# Patient Record
Sex: FEMALE | Race: WHITE | NOT HISPANIC OR LATINO | Employment: FULL TIME | URBAN - METROPOLITAN AREA
[De-identification: names, ages, dates, MRNs, and addresses within clinical notes are randomized per-mention and may not be internally consistent; named-entity substitution may affect disease eponyms.]

---
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
## 2021-04-29 ENCOUNTER — PATIENT MESSAGE (OUTPATIENT)
Dept: RESEARCH | Facility: HOSPITAL | Age: 21
End: 2021-04-29

## 2021-06-15 ENCOUNTER — OFFICE VISIT (OUTPATIENT)
Dept: FAMILY MEDICINE | Facility: CLINIC | Age: 21
End: 2021-06-15
Payer: COMMERCIAL

## 2021-06-15 VITALS
SYSTOLIC BLOOD PRESSURE: 103 MMHG | OXYGEN SATURATION: 100 % | DIASTOLIC BLOOD PRESSURE: 63 MMHG | HEIGHT: 61 IN | TEMPERATURE: 98 F | HEART RATE: 63 BPM | WEIGHT: 108 LBS | BODY MASS INDEX: 20.39 KG/M2

## 2021-06-15 DIAGNOSIS — F32.A DEPRESSION, UNSPECIFIED DEPRESSION TYPE: ICD-10-CM

## 2021-06-15 DIAGNOSIS — R10.84 ABDOMINAL PAIN, GENERALIZED: ICD-10-CM

## 2021-06-15 DIAGNOSIS — F90.9 ATTENTION DEFICIT HYPERACTIVITY DISORDER (ADHD), UNSPECIFIED ADHD TYPE: Primary | ICD-10-CM

## 2021-06-15 DIAGNOSIS — F41.9 ANXIETY: ICD-10-CM

## 2021-06-15 PROCEDURE — 99214 OFFICE O/P EST MOD 30 MIN: CPT | Mod: S$GLB,,, | Performed by: FAMILY MEDICINE

## 2021-06-15 PROCEDURE — 1126F PR PAIN SEVERITY QUANTIFIED, NO PAIN PRESENT: ICD-10-PCS | Mod: S$GLB,,, | Performed by: FAMILY MEDICINE

## 2021-06-15 PROCEDURE — 3008F BODY MASS INDEX DOCD: CPT | Mod: CPTII,S$GLB,, | Performed by: FAMILY MEDICINE

## 2021-06-15 PROCEDURE — 3008F PR BODY MASS INDEX (BMI) DOCUMENTED: ICD-10-PCS | Mod: CPTII,S$GLB,, | Performed by: FAMILY MEDICINE

## 2021-06-15 PROCEDURE — 1126F AMNT PAIN NOTED NONE PRSNT: CPT | Mod: S$GLB,,, | Performed by: FAMILY MEDICINE

## 2021-06-15 PROCEDURE — 99214 PR OFFICE/OUTPT VISIT, EST, LEVL IV, 30-39 MIN: ICD-10-PCS | Mod: S$GLB,,, | Performed by: FAMILY MEDICINE

## 2021-06-15 RX ORDER — SERTRALINE HYDROCHLORIDE 50 MG/1
TABLET, FILM COATED ORAL
Qty: 30 TABLET | Refills: 0 | Status: SHIPPED | OUTPATIENT
Start: 2021-06-15 | End: 2021-07-13

## 2021-06-15 RX ORDER — DEXTROAMPHETAMINE SACCHARATE, AMPHETAMINE ASPARTATE, DEXTROAMPHETAMINE SULFATE AND AMPHETAMINE SULFATE 5; 5; 5; 5 MG/1; MG/1; MG/1; MG/1
1 TABLET ORAL DAILY PRN
Qty: 30 TABLET | Refills: 0 | Status: SHIPPED | OUTPATIENT
Start: 2021-06-15 | End: 2021-07-13

## 2021-06-15 RX ORDER — BUPROPION HYDROCHLORIDE 300 MG/1
300 TABLET ORAL DAILY
Qty: 90 TABLET | Refills: 1 | Status: SHIPPED | OUTPATIENT
Start: 2021-06-15 | End: 2022-02-07

## 2021-06-16 ENCOUNTER — TELEPHONE (OUTPATIENT)
Dept: FAMILY MEDICINE | Facility: CLINIC | Age: 21
End: 2021-06-16

## 2021-07-13 ENCOUNTER — OFFICE VISIT (OUTPATIENT)
Dept: SURGERY | Facility: CLINIC | Age: 21
End: 2021-07-13
Payer: COMMERCIAL

## 2021-07-13 VITALS
HEART RATE: 71 BPM | SYSTOLIC BLOOD PRESSURE: 121 MMHG | TEMPERATURE: 97 F | DIASTOLIC BLOOD PRESSURE: 86 MMHG | BODY MASS INDEX: 20.39 KG/M2 | HEIGHT: 61 IN | WEIGHT: 108 LBS

## 2021-07-13 DIAGNOSIS — R10.84 ABDOMINAL PAIN, GENERALIZED: ICD-10-CM

## 2021-07-13 DIAGNOSIS — R11.0 POSTPRANDIAL NAUSEA: Primary | ICD-10-CM

## 2021-07-13 DIAGNOSIS — R10.84 GENERALIZED POSTPRANDIAL ABDOMINAL PAIN: ICD-10-CM

## 2021-07-13 PROCEDURE — 3008F PR BODY MASS INDEX (BMI) DOCUMENTED: ICD-10-PCS | Mod: CPTII,S$GLB,, | Performed by: SURGERY

## 2021-07-13 PROCEDURE — 99204 OFFICE O/P NEW MOD 45 MIN: CPT | Mod: S$GLB,,, | Performed by: SURGERY

## 2021-07-13 PROCEDURE — 3008F BODY MASS INDEX DOCD: CPT | Mod: CPTII,S$GLB,, | Performed by: SURGERY

## 2021-07-13 PROCEDURE — 99204 PR OFFICE/OUTPT VISIT, NEW, LEVL IV, 45-59 MIN: ICD-10-PCS | Mod: S$GLB,,, | Performed by: SURGERY

## 2021-07-13 PROCEDURE — 1126F PR PAIN SEVERITY QUANTIFIED, NO PAIN PRESENT: ICD-10-PCS | Mod: S$GLB,,, | Performed by: SURGERY

## 2021-07-13 PROCEDURE — 1126F AMNT PAIN NOTED NONE PRSNT: CPT | Mod: S$GLB,,, | Performed by: SURGERY

## 2021-07-20 ENCOUNTER — TELEPHONE (OUTPATIENT)
Dept: SURGERY | Facility: CLINIC | Age: 21
End: 2021-07-20

## 2021-07-27 ENCOUNTER — HOSPITAL ENCOUNTER (OUTPATIENT)
Dept: PREADMISSION TESTING | Facility: HOSPITAL | Age: 21
Discharge: HOME | End: 2021-07-27
Attending: SURGERY
Payer: COMMERCIAL

## 2021-07-27 ENCOUNTER — LAB VISIT (OUTPATIENT)
Dept: LAB | Facility: HOSPITAL | Age: 21
End: 2021-07-27
Attending: SURGERY
Payer: COMMERCIAL

## 2021-07-27 VITALS
SYSTOLIC BLOOD PRESSURE: 113 MMHG | HEART RATE: 72 BPM | WEIGHT: 112.56 LBS | RESPIRATION RATE: 14 BRPM | OXYGEN SATURATION: 100 % | TEMPERATURE: 98 F | BODY MASS INDEX: 21.27 KG/M2 | DIASTOLIC BLOOD PRESSURE: 78 MMHG

## 2021-07-27 DIAGNOSIS — Z01.818 PRE-OP TESTING: Primary | ICD-10-CM

## 2021-07-27 DIAGNOSIS — R10.84 GENERALIZED POSTPRANDIAL ABDOMINAL PAIN: ICD-10-CM

## 2021-07-27 DIAGNOSIS — R11.0 POSTPRANDIAL NAUSEA: Primary | ICD-10-CM

## 2021-07-27 DIAGNOSIS — Z01.818 PRE-OP TESTING: ICD-10-CM

## 2021-07-27 LAB
ANION GAP SERPL CALC-SCNC: 8 MMOL/L (ref 8–16)
BUN SERPL-MCNC: 9 MG/DL (ref 6–20)
CALCIUM SERPL-MCNC: 9.4 MG/DL (ref 8.7–10.5)
CHLORIDE SERPL-SCNC: 106 MMOL/L (ref 95–110)
CO2 SERPL-SCNC: 25 MMOL/L (ref 23–29)
CREAT SERPL-MCNC: 0.7 MG/DL (ref 0.5–1.4)
ERYTHROCYTE [DISTWIDTH] IN BLOOD BY AUTOMATED COUNT: 13.2 % (ref 11.5–14.5)
EST. GFR  (AFRICAN AMERICAN): >60 ML/MIN/1.73 M^2
EST. GFR  (NON AFRICAN AMERICAN): >60 ML/MIN/1.73 M^2
GLUCOSE SERPL-MCNC: 79 MG/DL (ref 70–110)
HCG SERPL QL: NEGATIVE
HCT VFR BLD AUTO: 39.1 % (ref 37–48.5)
HGB BLD-MCNC: 12.7 G/DL (ref 12–16)
MCH RBC QN AUTO: 30.1 PG (ref 27–31)
MCHC RBC AUTO-ENTMCNC: 32.5 G/DL (ref 32–36)
MCV RBC AUTO: 93 FL (ref 82–98)
PLATELET # BLD AUTO: 251 K/UL (ref 150–450)
PMV BLD AUTO: 9.9 FL (ref 9.2–12.9)
POTASSIUM SERPL-SCNC: 4 MMOL/L (ref 3.5–5.1)
RBC # BLD AUTO: 4.22 M/UL (ref 4–5.4)
SODIUM SERPL-SCNC: 139 MMOL/L (ref 136–145)
WBC # BLD AUTO: 4.68 K/UL (ref 3.9–12.7)

## 2021-07-27 PROCEDURE — 80048 BASIC METABOLIC PNL TOTAL CA: CPT | Performed by: SURGERY

## 2021-07-27 PROCEDURE — 85027 COMPLETE CBC AUTOMATED: CPT | Performed by: SURGERY

## 2021-07-27 PROCEDURE — 36415 COLL VENOUS BLD VENIPUNCTURE: CPT | Performed by: SURGERY

## 2021-07-27 PROCEDURE — 84703 CHORIONIC GONADOTROPIN ASSAY: CPT | Performed by: SURGERY

## 2021-07-27 RX ORDER — CEFOXITIN SODIUM 2 G/50ML
2 INJECTION, SOLUTION INTRAVENOUS
Status: DISCONTINUED | OUTPATIENT
Start: 2021-07-27 | End: 2021-07-27 | Stop reason: HOSPADM

## 2021-08-02 ENCOUNTER — HOSPITAL ENCOUNTER (OUTPATIENT)
Facility: HOSPITAL | Age: 21
Discharge: HOME | End: 2021-08-02
Attending: SURGERY | Admitting: SURGERY
Payer: COMMERCIAL

## 2021-08-02 ENCOUNTER — ANESTHESIA (OUTPATIENT)
Dept: SURGERY | Facility: HOSPITAL | Age: 21
End: 2021-08-02
Payer: COMMERCIAL

## 2021-08-02 ENCOUNTER — ANESTHESIA EVENT (OUTPATIENT)
Dept: SURGERY | Facility: HOSPITAL | Age: 21
End: 2021-08-02
Payer: COMMERCIAL

## 2021-08-02 VITALS
SYSTOLIC BLOOD PRESSURE: 97 MMHG | RESPIRATION RATE: 16 BRPM | WEIGHT: 112 LBS | OXYGEN SATURATION: 100 % | HEART RATE: 68 BPM | DIASTOLIC BLOOD PRESSURE: 60 MMHG | TEMPERATURE: 98 F | HEIGHT: 61 IN | BODY MASS INDEX: 21.14 KG/M2

## 2021-08-02 DIAGNOSIS — R11.0 POSTPRANDIAL NAUSEA: Primary | ICD-10-CM

## 2021-08-02 PROCEDURE — 71000015 HC POSTOP RECOV 1ST HR: Performed by: SURGERY

## 2021-08-02 PROCEDURE — 63600175 PHARM REV CODE 636 W HCPCS: Performed by: SURGERY

## 2021-08-02 PROCEDURE — 25000003 PHARM REV CODE 250: Performed by: ANESTHESIOLOGY

## 2021-08-02 PROCEDURE — 37000008 HC ANESTHESIA 1ST 15 MINUTES: Performed by: SURGERY

## 2021-08-02 PROCEDURE — 36000709 HC OR TIME LEV III EA ADD 15 MIN: Performed by: SURGERY

## 2021-08-02 PROCEDURE — 36000708 HC OR TIME LEV III 1ST 15 MIN: Performed by: SURGERY

## 2021-08-02 PROCEDURE — 27000080 OPTIME MED/SURG SUP & DEVICES GENERAL CLASSIFICATION: Performed by: SURGERY

## 2021-08-02 PROCEDURE — 71000039 HC RECOVERY, EACH ADD'L HOUR: Performed by: SURGERY

## 2021-08-02 PROCEDURE — 71000033 HC RECOVERY, INTIAL HOUR: Performed by: SURGERY

## 2021-08-02 PROCEDURE — 25000003 PHARM REV CODE 250: Performed by: SURGERY

## 2021-08-02 PROCEDURE — 63600175 PHARM REV CODE 636 W HCPCS: Performed by: ANESTHESIOLOGY

## 2021-08-02 PROCEDURE — 47562 PR LAP,CHOLECYSTECTOMY: ICD-10-PCS | Mod: ,,, | Performed by: SURGERY

## 2021-08-02 PROCEDURE — 37000009 HC ANESTHESIA EA ADD 15 MINS: Performed by: SURGERY

## 2021-08-02 PROCEDURE — 27201423 OPTIME MED/SURG SUP & DEVICES STERILE SUPPLY: Performed by: SURGERY

## 2021-08-02 PROCEDURE — 25000003 PHARM REV CODE 250: Performed by: NURSE ANESTHETIST, CERTIFIED REGISTERED

## 2021-08-02 PROCEDURE — 63600175 PHARM REV CODE 636 W HCPCS: Performed by: NURSE ANESTHETIST, CERTIFIED REGISTERED

## 2021-08-02 PROCEDURE — 47562 LAPAROSCOPIC CHOLECYSTECTOMY: CPT | Mod: ,,, | Performed by: SURGERY

## 2021-08-02 PROCEDURE — C9290 INJ, BUPIVACAINE LIPOSOME: HCPCS | Performed by: SURGERY

## 2021-08-02 RX ORDER — DIPHENHYDRAMINE HYDROCHLORIDE 50 MG/ML
INJECTION INTRAMUSCULAR; INTRAVENOUS
Status: DISCONTINUED | OUTPATIENT
Start: 2021-08-02 | End: 2021-08-02

## 2021-08-02 RX ORDER — ROCURONIUM BROMIDE 10 MG/ML
INJECTION, SOLUTION INTRAVENOUS
Status: DISCONTINUED | OUTPATIENT
Start: 2021-08-02 | End: 2021-08-02

## 2021-08-02 RX ORDER — FAMOTIDINE 10 MG/ML
INJECTION INTRAVENOUS
Status: DISCONTINUED | OUTPATIENT
Start: 2021-08-02 | End: 2021-08-02

## 2021-08-02 RX ORDER — DIPHENHYDRAMINE HYDROCHLORIDE 50 MG/ML
25 INJECTION INTRAMUSCULAR; INTRAVENOUS EVERY 6 HOURS PRN
Status: DISCONTINUED | OUTPATIENT
Start: 2021-08-02 | End: 2021-08-02 | Stop reason: HOSPADM

## 2021-08-02 RX ORDER — DEXAMETHASONE SODIUM PHOSPHATE 4 MG/ML
INJECTION, SOLUTION INTRA-ARTICULAR; INTRALESIONAL; INTRAMUSCULAR; INTRAVENOUS; SOFT TISSUE
Status: DISCONTINUED | OUTPATIENT
Start: 2021-08-02 | End: 2021-08-02

## 2021-08-02 RX ORDER — ONDANSETRON 4 MG/1
4 TABLET, ORALLY DISINTEGRATING ORAL ONCE
Status: DISCONTINUED | OUTPATIENT
Start: 2021-08-02 | End: 2021-08-02 | Stop reason: HOSPADM

## 2021-08-02 RX ORDER — DEXAMETHASONE SODIUM PHOSPHATE 4 MG/ML
4 INJECTION, SOLUTION INTRA-ARTICULAR; INTRALESIONAL; INTRAMUSCULAR; INTRAVENOUS; SOFT TISSUE ONCE
Status: DISCONTINUED | OUTPATIENT
Start: 2021-08-02 | End: 2021-08-02 | Stop reason: HOSPADM

## 2021-08-02 RX ORDER — SODIUM CHLORIDE 0.9 % (FLUSH) 0.9 %
10 SYRINGE (ML) INJECTION
Status: DISCONTINUED | OUTPATIENT
Start: 2021-08-02 | End: 2021-08-02 | Stop reason: HOSPADM

## 2021-08-02 RX ORDER — ACETAMINOPHEN 10 MG/ML
INJECTION, SOLUTION INTRAVENOUS
Status: DISCONTINUED | OUTPATIENT
Start: 2021-08-02 | End: 2021-08-02

## 2021-08-02 RX ORDER — HYDROMORPHONE HYDROCHLORIDE 1 MG/ML
0.2 INJECTION, SOLUTION INTRAMUSCULAR; INTRAVENOUS; SUBCUTANEOUS
Status: DISCONTINUED | OUTPATIENT
Start: 2021-08-02 | End: 2021-08-02 | Stop reason: HOSPADM

## 2021-08-02 RX ORDER — PROPOFOL 10 MG/ML
VIAL (ML) INTRAVENOUS
Status: DISCONTINUED | OUTPATIENT
Start: 2021-08-02 | End: 2021-08-02

## 2021-08-02 RX ORDER — HALOPERIDOL 5 MG/ML
0.5 INJECTION INTRAMUSCULAR EVERY 10 MIN PRN
Status: DISCONTINUED | OUTPATIENT
Start: 2021-08-02 | End: 2021-08-02 | Stop reason: HOSPADM

## 2021-08-02 RX ORDER — SCOLOPAMINE TRANSDERMAL SYSTEM 1 MG/1
1 PATCH, EXTENDED RELEASE TRANSDERMAL
Status: DISCONTINUED | OUTPATIENT
Start: 2021-08-02 | End: 2021-08-02 | Stop reason: HOSPADM

## 2021-08-02 RX ORDER — DIPHENHYDRAMINE HYDROCHLORIDE 50 MG/ML
12.5 INJECTION INTRAMUSCULAR; INTRAVENOUS EVERY 4 HOURS
Status: DISCONTINUED | OUTPATIENT
Start: 2021-08-02 | End: 2021-08-02 | Stop reason: HOSPADM

## 2021-08-02 RX ORDER — ONDANSETRON 2 MG/ML
4 INJECTION INTRAMUSCULAR; INTRAVENOUS DAILY PRN
Status: DISCONTINUED | OUTPATIENT
Start: 2021-08-02 | End: 2021-08-02 | Stop reason: HOSPADM

## 2021-08-02 RX ORDER — MEPERIDINE HYDROCHLORIDE 50 MG/ML
12.5 INJECTION INTRAMUSCULAR; INTRAVENOUS; SUBCUTANEOUS EVERY 10 MIN PRN
Status: DISCONTINUED | OUTPATIENT
Start: 2021-08-02 | End: 2021-08-02 | Stop reason: HOSPADM

## 2021-08-02 RX ORDER — BUPIVACAINE HYDROCHLORIDE AND EPINEPHRINE 2.5; 5 MG/ML; UG/ML
INJECTION, SOLUTION EPIDURAL; INFILTRATION; INTRACAUDAL; PERINEURAL
Status: DISCONTINUED | OUTPATIENT
Start: 2021-08-02 | End: 2021-08-02 | Stop reason: HOSPADM

## 2021-08-02 RX ORDER — LIDOCAINE HCL/PF 100 MG/5ML
SYRINGE (ML) INTRAVENOUS
Status: DISCONTINUED | OUTPATIENT
Start: 2021-08-02 | End: 2021-08-02

## 2021-08-02 RX ORDER — OXYCODONE HYDROCHLORIDE 5 MG/1
5 TABLET ORAL
Status: DISCONTINUED | OUTPATIENT
Start: 2021-08-02 | End: 2021-08-02 | Stop reason: HOSPADM

## 2021-08-02 RX ORDER — SUCCINYLCHOLINE CHLORIDE 20 MG/ML
INJECTION INTRAMUSCULAR; INTRAVENOUS
Status: DISCONTINUED | OUTPATIENT
Start: 2021-08-02 | End: 2021-08-02

## 2021-08-02 RX ORDER — ONDANSETRON 2 MG/ML
INJECTION INTRAMUSCULAR; INTRAVENOUS
Status: DISCONTINUED | OUTPATIENT
Start: 2021-08-02 | End: 2021-08-02

## 2021-08-02 RX ORDER — FENTANYL CITRATE 50 UG/ML
INJECTION, SOLUTION INTRAMUSCULAR; INTRAVENOUS
Status: DISCONTINUED | OUTPATIENT
Start: 2021-08-02 | End: 2021-08-02

## 2021-08-02 RX ORDER — OXYCODONE HYDROCHLORIDE 5 MG/1
5 TABLET ORAL EVERY 4 HOURS PRN
Status: DISCONTINUED | OUTPATIENT
Start: 2021-08-02 | End: 2021-08-02 | Stop reason: HOSPADM

## 2021-08-02 RX ORDER — HYDROCODONE BITARTRATE AND ACETAMINOPHEN 5; 325 MG/1; MG/1
1 TABLET ORAL EVERY 6 HOURS PRN
Qty: 25 TABLET | Refills: 0 | Status: SHIPPED | OUTPATIENT
Start: 2021-08-02 | End: 2021-08-17

## 2021-08-02 RX ADMIN — HYDROMORPHONE HYDROCHLORIDE 0.2 MG: 1 INJECTION, SOLUTION INTRAMUSCULAR; INTRAVENOUS; SUBCUTANEOUS at 10:08

## 2021-08-02 RX ADMIN — PROPOFOL 160 MG: 10 INJECTION, EMULSION INTRAVENOUS at 09:08

## 2021-08-02 RX ADMIN — FENTANYL CITRATE 100 MCG: 50 INJECTION INTRAMUSCULAR; INTRAVENOUS at 09:08

## 2021-08-02 RX ADMIN — DIPHENHYDRAMINE HYDROCHLORIDE 6.25 MG: 50 INJECTION, SOLUTION INTRAMUSCULAR; INTRAVENOUS at 09:08

## 2021-08-02 RX ADMIN — HYDROMORPHONE HYDROCHLORIDE 0.2 MG: 1 INJECTION, SOLUTION INTRAMUSCULAR; INTRAVENOUS; SUBCUTANEOUS at 11:08

## 2021-08-02 RX ADMIN — SODIUM CHLORIDE, SODIUM LACTATE, POTASSIUM CHLORIDE, AND CALCIUM CHLORIDE: .6; .31; .03; .02 INJECTION, SOLUTION INTRAVENOUS at 09:08

## 2021-08-02 RX ADMIN — ROCURONIUM BROMIDE 40 MG: 10 INJECTION, SOLUTION INTRAVENOUS at 09:08

## 2021-08-02 RX ADMIN — MIDAZOLAM HYDROCHLORIDE 2 MG: 5 INJECTION, SOLUTION INTRAMUSCULAR; INTRAVENOUS at 09:08

## 2021-08-02 RX ADMIN — FAMOTIDINE 20 MG: 10 INJECTION, SOLUTION INTRAVENOUS at 09:08

## 2021-08-02 RX ADMIN — DEXAMETHASONE SODIUM PHOSPHATE 8 MG: 4 INJECTION, SOLUTION INTRAMUSCULAR; INTRAVENOUS at 09:08

## 2021-08-02 RX ADMIN — ROCURONIUM BROMIDE 5 MG: 10 INJECTION, SOLUTION INTRAVENOUS at 09:08

## 2021-08-02 RX ADMIN — LIDOCAINE HYDROCHLORIDE 40 MG: 20 INJECTION, SOLUTION INTRAVENOUS at 09:08

## 2021-08-02 RX ADMIN — SCOPOLAMINE 1 PATCH: 1 PATCH TRANSDERMAL at 08:08

## 2021-08-02 RX ADMIN — OXYCODONE HYDROCHLORIDE 5 MG: 5 TABLET ORAL at 10:08

## 2021-08-02 RX ADMIN — ONDANSETRON 4 MG: 2 INJECTION INTRAMUSCULAR; INTRAVENOUS at 09:08

## 2021-08-02 RX ADMIN — SUCCINYLCHOLINE CHLORIDE 120 MG: 20 INJECTION, SOLUTION INTRAMUSCULAR; INTRAVENOUS at 09:08

## 2021-08-02 RX ADMIN — ACETAMINOPHEN 1000 MG: 10 INJECTION, SOLUTION INTRAVENOUS at 09:08

## 2021-08-03 RX ORDER — MIDAZOLAM HYDROCHLORIDE 5 MG/ML
INJECTION INTRAMUSCULAR; INTRAVENOUS
Status: DISCONTINUED | OUTPATIENT
Start: 2021-08-02 | End: 2021-08-03

## 2021-08-17 ENCOUNTER — OFFICE VISIT (OUTPATIENT)
Dept: SURGERY | Facility: CLINIC | Age: 21
End: 2021-08-17
Payer: COMMERCIAL

## 2021-08-17 VITALS — WEIGHT: 112 LBS | TEMPERATURE: 99 F | BODY MASS INDEX: 21.14 KG/M2 | HEIGHT: 61 IN

## 2021-08-17 DIAGNOSIS — K81.1 CHRONIC CHOLECYSTITIS: Primary | ICD-10-CM

## 2021-08-17 PROCEDURE — 99024 PR POST-OP FOLLOW-UP VISIT: ICD-10-PCS | Mod: S$GLB,,, | Performed by: SURGERY

## 2021-08-17 PROCEDURE — 99024 POSTOP FOLLOW-UP VISIT: CPT | Mod: S$GLB,,, | Performed by: SURGERY

## 2021-11-09 ENCOUNTER — OFFICE VISIT (OUTPATIENT)
Dept: FAMILY MEDICINE | Facility: CLINIC | Age: 21
End: 2021-11-09
Payer: COMMERCIAL

## 2021-11-09 VITALS
OXYGEN SATURATION: 99 % | DIASTOLIC BLOOD PRESSURE: 81 MMHG | HEIGHT: 61 IN | HEART RATE: 93 BPM | BODY MASS INDEX: 19.63 KG/M2 | WEIGHT: 104 LBS | TEMPERATURE: 97 F | SYSTOLIC BLOOD PRESSURE: 121 MMHG

## 2021-11-09 DIAGNOSIS — Z11.4 SCREENING FOR HIV (HUMAN IMMUNODEFICIENCY VIRUS): Primary | ICD-10-CM

## 2021-11-09 DIAGNOSIS — Z11.59 ENCOUNTER FOR HEPATITIS C SCREENING TEST FOR LOW RISK PATIENT: ICD-10-CM

## 2021-11-09 DIAGNOSIS — Z11.3 SCREEN FOR STD (SEXUALLY TRANSMITTED DISEASE): ICD-10-CM

## 2021-11-09 DIAGNOSIS — Z90.49 S/P CHOLECYSTECTOMY: ICD-10-CM

## 2021-11-09 PROCEDURE — 3079F PR MOST RECENT DIASTOLIC BLOOD PRESSURE 80-89 MM HG: ICD-10-PCS | Mod: CPTII,S$GLB,, | Performed by: FAMILY MEDICINE

## 2021-11-09 PROCEDURE — 1159F PR MEDICATION LIST DOCUMENTED IN MEDICAL RECORD: ICD-10-PCS | Mod: CPTII,S$GLB,, | Performed by: FAMILY MEDICINE

## 2021-11-09 PROCEDURE — 3074F SYST BP LT 130 MM HG: CPT | Mod: CPTII,S$GLB,, | Performed by: FAMILY MEDICINE

## 2021-11-09 PROCEDURE — 90471 IMMUNIZATION ADMIN: CPT | Mod: S$GLB,,, | Performed by: FAMILY MEDICINE

## 2021-11-09 PROCEDURE — 3008F BODY MASS INDEX DOCD: CPT | Mod: CPTII,S$GLB,, | Performed by: FAMILY MEDICINE

## 2021-11-09 PROCEDURE — 3008F PR BODY MASS INDEX (BMI) DOCUMENTED: ICD-10-PCS | Mod: CPTII,S$GLB,, | Performed by: FAMILY MEDICINE

## 2021-11-09 PROCEDURE — 3079F DIAST BP 80-89 MM HG: CPT | Mod: CPTII,S$GLB,, | Performed by: FAMILY MEDICINE

## 2021-11-09 PROCEDURE — 1160F PR REVIEW ALL MEDS BY PRESCRIBER/CLIN PHARMACIST DOCUMENTED: ICD-10-PCS | Mod: CPTII,S$GLB,, | Performed by: FAMILY MEDICINE

## 2021-11-09 PROCEDURE — 90686 IIV4 VACC NO PRSV 0.5 ML IM: CPT | Mod: S$GLB,,, | Performed by: FAMILY MEDICINE

## 2021-11-09 PROCEDURE — 99213 PR OFFICE/OUTPT VISIT, EST, LEVL III, 20-29 MIN: ICD-10-PCS | Mod: 25,S$GLB,, | Performed by: FAMILY MEDICINE

## 2021-11-09 PROCEDURE — 1160F RVW MEDS BY RX/DR IN RCRD: CPT | Mod: CPTII,S$GLB,, | Performed by: FAMILY MEDICINE

## 2021-11-09 PROCEDURE — 1159F MED LIST DOCD IN RCRD: CPT | Mod: CPTII,S$GLB,, | Performed by: FAMILY MEDICINE

## 2021-11-09 PROCEDURE — 90471 FLU VACCINE (QUAD) GREATER THAN OR EQUAL TO 3YO PRESERVATIVE FREE IM: ICD-10-PCS | Mod: S$GLB,,, | Performed by: FAMILY MEDICINE

## 2021-11-09 PROCEDURE — 90686 FLU VACCINE (QUAD) GREATER THAN OR EQUAL TO 3YO PRESERVATIVE FREE IM: ICD-10-PCS | Mod: S$GLB,,, | Performed by: FAMILY MEDICINE

## 2021-11-09 PROCEDURE — 3074F PR MOST RECENT SYSTOLIC BLOOD PRESSURE < 130 MM HG: ICD-10-PCS | Mod: CPTII,S$GLB,, | Performed by: FAMILY MEDICINE

## 2021-11-09 PROCEDURE — 99213 OFFICE O/P EST LOW 20 MIN: CPT | Mod: 25,S$GLB,, | Performed by: FAMILY MEDICINE

## 2021-11-10 PROBLEM — Z90.49 S/P CHOLECYSTECTOMY: Status: ACTIVE | Noted: 2021-11-10

## 2021-11-13 LAB
HCV AB S/CO SERPL IA: 0.01
HCV AB SERPL QL IA: NORMAL
HIV 1+2 AB+HIV1 P24 AG SERPL QL IA: NORMAL
HSV1 IGG SER IA-ACNC: 5.69 INDEX
HSV2 IGG SER IA-ACNC: <0.9 INDEX
RPR SER QL: NORMAL

## 2021-11-15 ENCOUNTER — TELEPHONE (OUTPATIENT)
Dept: FAMILY MEDICINE | Facility: CLINIC | Age: 21
End: 2021-11-15
Payer: COMMERCIAL

## 2021-11-17 ENCOUNTER — TELEPHONE (OUTPATIENT)
Dept: FAMILY MEDICINE | Facility: CLINIC | Age: 21
End: 2021-11-17
Payer: COMMERCIAL

## 2021-11-19 ENCOUNTER — TELEPHONE (OUTPATIENT)
Dept: FAMILY MEDICINE | Facility: CLINIC | Age: 21
End: 2021-11-19
Payer: COMMERCIAL

## 2021-12-31 ENCOUNTER — PATIENT MESSAGE (OUTPATIENT)
Dept: FAMILY MEDICINE | Facility: CLINIC | Age: 21
End: 2021-12-31
Payer: COMMERCIAL

## 2022-01-21 NOTE — TELEPHONE ENCOUNTER
No new care gaps identified.  Powered by [employer]. Reference number: [id number].   1/21/2022 8:33:46 AM CST

## 2022-02-07 RX ORDER — BUPROPION HYDROCHLORIDE 300 MG/1
TABLET ORAL
Qty: 90 TABLET | Refills: 2 | Status: SHIPPED | OUTPATIENT
Start: 2022-02-07 | End: 2023-03-22

## 2022-02-07 NOTE — TELEPHONE ENCOUNTER
Refill Authorization Note   Kandis Sosa  is requesting a refill authorization.  Brief Assessment and Rationale for Refill:  Approve     Medication Therapy Plan:       Medication Reconciliation Completed: No   Comments:   --->Care Gap information included below if applicable.   Orders Placed This Encounter    buPROPion (WELLBUTRIN XL) 300 MG 24 hr tablet      Requested Prescriptions   Signed Prescriptions Disp Refills    buPROPion (WELLBUTRIN XL) 300 MG 24 hr tablet 90 tablet 2     Sig: TAKE 1 TABLET(300 MG) BY MOUTH EVERY DAY       Psychiatry: Antidepressants - bupropion Passed - 1/21/2022  8:33 AM        Passed - Patient is at least 18 years old        Passed - Negative Pregnancy Status Check        Passed - Last BP in normal range within 360 days     BP Readings from Last 1 Encounters:   11/09/21 121/81               Passed - Valid encounter within last 15 months     Recent Visits  Date Type Provider Dept   11/09/21 Office Visit Ramon Meyers III, MD [facility]   06/15/21 Office Visit Ramon Meyers III, MD [facility]   12/22/20 Office Visit [facility]   11/11/20 Office Visit Ramon Meyers III, MD [facility]   Showing recent visits within past 720 days and meeting all other requirements  Future Appointments  No visits were found meeting these conditions.  Showing future appointments within next 150 days and meeting all other requirements      Future Appointments              In 2 days [facility]    In 2 days [facility]                    Appointments  past 12m or future 3m with PCP    Date Provider   Last Visit   11/9/2021 Ramon Meyers III, MD   Next Visit   Visit date not found Ramon Meyers III, MD   ED visits in past 90 days: 0     Note composed:4:25 PM 02/07/2022

## 2022-11-03 ENCOUNTER — PATIENT MESSAGE (OUTPATIENT)
Dept: ADMINISTRATIVE | Facility: HOSPITAL | Age: 22
End: 2022-11-03
Payer: COMMERCIAL

## 2022-11-16 ENCOUNTER — OFFICE VISIT (OUTPATIENT)
Dept: FAMILY MEDICINE | Facility: CLINIC | Age: 22
End: 2022-11-16
Payer: COMMERCIAL

## 2022-11-16 ENCOUNTER — PATIENT OUTREACH (OUTPATIENT)
Dept: ADMINISTRATIVE | Facility: HOSPITAL | Age: 22
End: 2022-11-16
Payer: COMMERCIAL

## 2022-11-16 VITALS
WEIGHT: 121.31 LBS | SYSTOLIC BLOOD PRESSURE: 118 MMHG | TEMPERATURE: 98 F | HEIGHT: 61 IN | BODY MASS INDEX: 22.91 KG/M2 | OXYGEN SATURATION: 99 % | DIASTOLIC BLOOD PRESSURE: 68 MMHG | HEART RATE: 78 BPM

## 2022-11-16 DIAGNOSIS — H10.9 CONJUNCTIVITIS OF LEFT EYE, UNSPECIFIED CONJUNCTIVITIS TYPE: Primary | ICD-10-CM

## 2022-11-16 PROCEDURE — 3008F BODY MASS INDEX DOCD: CPT | Mod: CPTII,S$GLB,, | Performed by: FAMILY MEDICINE

## 2022-11-16 PROCEDURE — 99213 PR OFFICE/OUTPT VISIT, EST, LEVL III, 20-29 MIN: ICD-10-PCS | Mod: S$GLB,,, | Performed by: FAMILY MEDICINE

## 2022-11-16 PROCEDURE — 3078F DIAST BP <80 MM HG: CPT | Mod: CPTII,S$GLB,, | Performed by: FAMILY MEDICINE

## 2022-11-16 PROCEDURE — 99213 OFFICE O/P EST LOW 20 MIN: CPT | Mod: S$GLB,,, | Performed by: FAMILY MEDICINE

## 2022-11-16 PROCEDURE — 1159F MED LIST DOCD IN RCRD: CPT | Mod: CPTII,S$GLB,, | Performed by: FAMILY MEDICINE

## 2022-11-16 PROCEDURE — 3074F PR MOST RECENT SYSTOLIC BLOOD PRESSURE < 130 MM HG: ICD-10-PCS | Mod: CPTII,S$GLB,, | Performed by: FAMILY MEDICINE

## 2022-11-16 PROCEDURE — 3074F SYST BP LT 130 MM HG: CPT | Mod: CPTII,S$GLB,, | Performed by: FAMILY MEDICINE

## 2022-11-16 PROCEDURE — 1160F RVW MEDS BY RX/DR IN RCRD: CPT | Mod: CPTII,S$GLB,, | Performed by: FAMILY MEDICINE

## 2022-11-16 PROCEDURE — 3078F PR MOST RECENT DIASTOLIC BLOOD PRESSURE < 80 MM HG: ICD-10-PCS | Mod: CPTII,S$GLB,, | Performed by: FAMILY MEDICINE

## 2022-11-16 PROCEDURE — 3008F PR BODY MASS INDEX (BMI) DOCUMENTED: ICD-10-PCS | Mod: CPTII,S$GLB,, | Performed by: FAMILY MEDICINE

## 2022-11-16 PROCEDURE — 1159F PR MEDICATION LIST DOCUMENTED IN MEDICAL RECORD: ICD-10-PCS | Mod: CPTII,S$GLB,, | Performed by: FAMILY MEDICINE

## 2022-11-16 PROCEDURE — 1160F PR REVIEW ALL MEDS BY PRESCRIBER/CLIN PHARMACIST DOCUMENTED: ICD-10-PCS | Mod: CPTII,S$GLB,, | Performed by: FAMILY MEDICINE

## 2022-11-16 RX ORDER — CETIRIZINE HYDROCHLORIDE 10 MG/1
10 TABLET ORAL NIGHTLY
COMMUNITY
Start: 2022-08-04 | End: 2023-11-30

## 2022-11-16 RX ORDER — CIPROFLOXACIN HYDROCHLORIDE 3 MG/ML
2 SOLUTION/ DROPS OPHTHALMIC EVERY 4 HOURS
Qty: 10 ML | Refills: 0 | Status: SHIPPED | OUTPATIENT
Start: 2022-11-16 | End: 2023-01-07

## 2022-11-16 RX ORDER — POLYMYXIN B SULFATE AND TRIMETHOPRIM 1; 10000 MG/ML; [USP'U]/ML
1 SOLUTION OPHTHALMIC EVERY 6 HOURS
COMMUNITY
Start: 2022-11-03 | End: 2023-07-27

## 2022-11-16 RX ORDER — DEXTROAMPHETAMINE SACCHARATE, AMPHETAMINE ASPARTATE MONOHYDRATE, DEXTROAMPHETAMINE SULFATE AND AMPHETAMINE SULFATE 5; 5; 5; 5 MG/1; MG/1; MG/1; MG/1
20 CAPSULE, EXTENDED RELEASE ORAL
COMMUNITY
End: 2023-03-22

## 2022-11-16 RX ORDER — CIPROFLOXACIN HYDROCHLORIDE 3 MG/ML
1 SOLUTION/ DROPS OPHTHALMIC
COMMUNITY
End: 2022-11-16 | Stop reason: SDUPTHER

## 2022-11-16 NOTE — PROGRESS NOTES
Subjective:       Patient ID: Kandis Sosa is a 22 y.o. female.    Chief Complaint: eye infection    HPI  Patient presents to clinic for left eye conjunctivitis for 2 weeks. No contact use. No known exposure to pink eye. Per patient symptoms started after her cat sneezed in her eye. Denies vision changes. Some photophobia.  prescribed her Polytrim. No antibiotic allergies.   Review of Systems   Eyes:  Positive for photophobia, pain, redness and itching. Negative for visual disturbance.   Respiratory: Negative.     Cardiovascular: Negative.    Psychiatric/Behavioral: Negative.         Objective:      Physical Exam  Constitutional:       Appearance: Normal appearance.   HENT:      Right Ear: Tympanic membrane, ear canal and external ear normal.      Left Ear: Tympanic membrane, ear canal and external ear normal.      Nose: Nose normal.      Mouth/Throat:      Mouth: Mucous membranes are moist.      Pharynx: Oropharynx is clear.   Eyes:      Conjunctiva/sclera:      Left eye: Left conjunctiva is injected.      Comments: 20/20 OU   Neck:      Vascular: No carotid bruit.   Cardiovascular:      Rate and Rhythm: Normal rate and regular rhythm.      Pulses: Normal pulses.      Heart sounds: Normal heart sounds.   Pulmonary:      Effort: Pulmonary effort is normal.      Breath sounds: Normal breath sounds.   Lymphadenopathy:      Cervical: No cervical adenopathy.   Skin:     General: Skin is warm and dry.   Neurological:      Mental Status: She is alert.   Psychiatric:         Behavior: Behavior normal.     Mi left corneal injection.  No photophobia.  Visual acuity is good 2020 in each eye separately and in both together.  No adenopathy.  Extraocular muscles intact.  No evidence of corneal abrasion.  No foreign body.  Assessment/Plan:     Conjunctivitis of left eye, unspecified conjunctivitis type    Other orders  -     ciprofloxacin HCl (CILOXAN) 0.3 % ophthalmic solution; Place 2 drops into the left eye every 4  (four) hours.  Dispense: 10 mL; Refill: 0     Ciloxan ophthalmic drops to q.i.d. to the left eye.

## 2022-11-16 NOTE — LETTER
AUTHORIZATION FOR RELEASE OF   CONFIDENTIAL INFORMATION    Dear Dr Patterson,    We are seeing Kandis Sosa, date of birth 2000, in the clinic at [facility]. Ramon Meyers III, MD is the patient's PCP. Kandis Sosa has an outstanding lab/procedure at the time we reviewed her chart. In order to help keep her health information updated, she has authorized us to request the following medical record(s):        (  )  MAMMOGRAM                                      (  )  COLONOSCOPY      ( X )  PAP SMEAR                                          (  )  OUTSIDE LAB RESULTS     (  )  DEXA SCAN                                          (  )  EYE EXAM            (  )  FOOT EXAM                                          (  )  ENTIRE RECORD     (  )  OUTSIDE IMMUNIZATIONS                 (  )  _______________         Please fax records to [facility], Clinton H Sharp III, MD at [phone]    Thanks so much and have a great day!    Patti Sorto LPN [facility]  [address] [place] [address]  P- [phone]   [phone]          Patient Name: Kandis Sosa  : 2000  Patient Phone #: [phone]

## 2022-11-21 ENCOUNTER — PATIENT MESSAGE (OUTPATIENT)
Dept: ADMINISTRATIVE | Facility: HOSPITAL | Age: 22
End: 2022-11-21
Payer: COMMERCIAL

## 2022-12-06 ENCOUNTER — PATIENT MESSAGE (OUTPATIENT)
Dept: ADMINISTRATIVE | Facility: HOSPITAL | Age: 22
End: 2022-12-06
Payer: COMMERCIAL

## 2022-12-29 ENCOUNTER — OCCUPATIONAL HEALTH (OUTPATIENT)
Dept: URGENT CARE | Facility: CLINIC | Age: 22
End: 2022-12-29

## 2022-12-29 DIAGNOSIS — Z02.83 ENCOUNTER FOR DRUG SCREENING: Primary | ICD-10-CM

## 2022-12-29 LAB
CTP QC/QA: YES
POC 10 PANEL DRUG SCREEN: NEGATIVE

## 2022-12-29 PROCEDURE — 80305 DRUG TEST PRSMV DIR OPT OBS: CPT | Mod: S$GLB,,, | Performed by: NURSE PRACTITIONER

## 2022-12-29 PROCEDURE — 80305 POCT RAPID DRUG SCREEN 10 PANEL: ICD-10-PCS | Mod: S$GLB,,, | Performed by: NURSE PRACTITIONER

## 2023-02-15 ENCOUNTER — PATIENT MESSAGE (OUTPATIENT)
Dept: ADMINISTRATIVE | Facility: HOSPITAL | Age: 23
End: 2023-02-15
Payer: COMMERCIAL

## 2023-02-15 ENCOUNTER — PATIENT OUTREACH (OUTPATIENT)
Dept: ADMINISTRATIVE | Facility: HOSPITAL | Age: 23
End: 2023-02-15
Payer: COMMERCIAL

## 2023-02-15 NOTE — PROGRESS NOTES
Cervical Cancer GAP report-Left Vm and portal message sent out regarding pt's overdue Pap smear.

## 2023-02-23 ENCOUNTER — PATIENT OUTREACH (OUTPATIENT)
Dept: ADMINISTRATIVE | Facility: HOSPITAL | Age: 23
End: 2023-02-23
Payer: COMMERCIAL

## 2023-02-23 NOTE — LETTER
February 23, 2023    Kandis Sosa  [address] [place] [address]             [facility]  [address]  [place] [address]  Phone: [phone] Dear Ms. Sosa,        Good afternoon!!     I am reaching out to you because you are due for a Pap smear. Have you had a recent pap smear, if so when and where. If you have not had a pap smear, can I schedule one for you with an [facility] and let you know when it is scheduled for?     Thanks so much and have a great day!!     Patti Sorto LPN [facility]  [facility]  [address] [place] [address]  P- [phone]  F- [phone]

## 2023-02-23 NOTE — PROGRESS NOTES
"Attempted to outreach patient for pre-visit via "Aubrey", no answer after a week. Sending outreach via Mail Out Letter now.     "

## 2023-02-25 ENCOUNTER — PATIENT MESSAGE (OUTPATIENT)
Dept: ADMINISTRATIVE | Facility: HOSPITAL | Age: 23
End: 2023-02-25
Payer: COMMERCIAL

## 2023-02-27 ENCOUNTER — PATIENT MESSAGE (OUTPATIENT)
Dept: ADMINISTRATIVE | Facility: HOSPITAL | Age: 23
End: 2023-02-27
Payer: COMMERCIAL

## 2023-02-27 ENCOUNTER — PATIENT OUTREACH (OUTPATIENT)
Dept: ADMINISTRATIVE | Facility: HOSPITAL | Age: 23
End: 2023-02-27
Payer: COMMERCIAL

## 2023-02-27 NOTE — LETTER
AUTHORIZATION FOR RELEASE OF   CONFIDENTIAL INFORMATION    Dear Dr Patterson,    We are seeing Kandis Sosa, date of birth 2000, in the clinic at [facility]. Ramon Meyers III, MD is the patient's PCP. Kandis Sosa has an outstanding lab/procedure at the time we reviewed her chart. In order to help keep her health information updated, she has authorized us to request the following medical record(s):        (  )  MAMMOGRAM                                      (  )  COLONOSCOPY      ( X )  PAP SMEAR                                          (  )  OUTSIDE LAB RESULTS     (  )  DEXA SCAN                                          (  )  EYE EXAM            (  )  FOOT EXAM                                          (  )  ENTIRE RECORD     (  )  OUTSIDE IMMUNIZATIONS                 (  )  _______________         Please fax records to [facility], Clinton H Sharp III, MD at [phone]     Thank so much and have a great day!    Patti Sorto LPN [facility]  [address] [address]  P- [phone]  F [phone]          Patient Name: Kandis Sosa  : 2000  Patient Phone #: [phone]

## 2023-03-17 ENCOUNTER — PATIENT MESSAGE (OUTPATIENT)
Dept: ADMINISTRATIVE | Facility: HOSPITAL | Age: 23
End: 2023-03-17
Payer: COMMERCIAL

## 2023-03-22 ENCOUNTER — TELEPHONE (OUTPATIENT)
Dept: OPHTHALMOLOGY | Facility: CLINIC | Age: 23
End: 2023-03-22
Payer: COMMERCIAL

## 2023-03-22 ENCOUNTER — OFFICE VISIT (OUTPATIENT)
Dept: FAMILY MEDICINE | Facility: CLINIC | Age: 23
End: 2023-03-22
Payer: COMMERCIAL

## 2023-03-22 VITALS
BODY MASS INDEX: 22.31 KG/M2 | HEART RATE: 84 BPM | TEMPERATURE: 98 F | DIASTOLIC BLOOD PRESSURE: 68 MMHG | SYSTOLIC BLOOD PRESSURE: 100 MMHG | RESPIRATION RATE: 16 BRPM | OXYGEN SATURATION: 99 % | WEIGHT: 118.19 LBS | HEIGHT: 61 IN

## 2023-03-22 DIAGNOSIS — J02.9 SORE THROAT: ICD-10-CM

## 2023-03-22 DIAGNOSIS — R05.1 ACUTE COUGH: ICD-10-CM

## 2023-03-22 DIAGNOSIS — Z20.822 SUSPECTED COVID-19 VIRUS INFECTION: Primary | ICD-10-CM

## 2023-03-22 DIAGNOSIS — H10.9 CONJUNCTIVITIS OF LEFT EYE, UNSPECIFIED CONJUNCTIVITIS TYPE: ICD-10-CM

## 2023-03-22 PROCEDURE — 99214 PR OFFICE/OUTPT VISIT, EST, LEVL IV, 30-39 MIN: ICD-10-PCS | Mod: S$GLB,,, | Performed by: INTERNAL MEDICINE

## 2023-03-22 PROCEDURE — 3078F PR MOST RECENT DIASTOLIC BLOOD PRESSURE < 80 MM HG: ICD-10-PCS | Mod: CPTII,S$GLB,, | Performed by: INTERNAL MEDICINE

## 2023-03-22 PROCEDURE — 3074F PR MOST RECENT SYSTOLIC BLOOD PRESSURE < 130 MM HG: ICD-10-PCS | Mod: CPTII,S$GLB,, | Performed by: INTERNAL MEDICINE

## 2023-03-22 PROCEDURE — 1159F MED LIST DOCD IN RCRD: CPT | Mod: CPTII,S$GLB,, | Performed by: INTERNAL MEDICINE

## 2023-03-22 PROCEDURE — 1160F PR REVIEW ALL MEDS BY PRESCRIBER/CLIN PHARMACIST DOCUMENTED: ICD-10-PCS | Mod: CPTII,S$GLB,, | Performed by: INTERNAL MEDICINE

## 2023-03-22 PROCEDURE — 99214 OFFICE O/P EST MOD 30 MIN: CPT | Mod: S$GLB,,, | Performed by: INTERNAL MEDICINE

## 2023-03-22 PROCEDURE — 1159F PR MEDICATION LIST DOCUMENTED IN MEDICAL RECORD: ICD-10-PCS | Mod: CPTII,S$GLB,, | Performed by: INTERNAL MEDICINE

## 2023-03-22 PROCEDURE — 3008F BODY MASS INDEX DOCD: CPT | Mod: CPTII,S$GLB,, | Performed by: INTERNAL MEDICINE

## 2023-03-22 PROCEDURE — 99999 PR PBB SHADOW E&M-EST. PATIENT-LVL III: CPT | Mod: PBBFAC,,, | Performed by: INTERNAL MEDICINE

## 2023-03-22 PROCEDURE — U0005 INFEC AGEN DETEC AMPLI PROBE: HCPCS | Performed by: INTERNAL MEDICINE

## 2023-03-22 PROCEDURE — 1160F RVW MEDS BY RX/DR IN RCRD: CPT | Mod: CPTII,S$GLB,, | Performed by: INTERNAL MEDICINE

## 2023-03-22 PROCEDURE — 3008F PR BODY MASS INDEX (BMI) DOCUMENTED: ICD-10-PCS | Mod: CPTII,S$GLB,, | Performed by: INTERNAL MEDICINE

## 2023-03-22 PROCEDURE — 99999 PR PBB SHADOW E&M-EST. PATIENT-LVL III: ICD-10-PCS | Mod: PBBFAC,,, | Performed by: INTERNAL MEDICINE

## 2023-03-22 PROCEDURE — 3074F SYST BP LT 130 MM HG: CPT | Mod: CPTII,S$GLB,, | Performed by: INTERNAL MEDICINE

## 2023-03-22 PROCEDURE — 3078F DIAST BP <80 MM HG: CPT | Mod: CPTII,S$GLB,, | Performed by: INTERNAL MEDICINE

## 2023-03-22 RX ORDER — METHYLPREDNISOLONE 4 MG/1
TABLET ORAL
Qty: 1 EACH | Refills: 0 | Status: SHIPPED | OUTPATIENT
Start: 2023-03-22 | End: 2023-11-30

## 2023-03-22 RX ORDER — PROMETHAZINE HYDROCHLORIDE AND DEXTROMETHORPHAN HYDROBROMIDE 6.25; 15 MG/5ML; MG/5ML
5 SYRUP ORAL NIGHTLY PRN
Qty: 118 ML | Refills: 0 | Status: SHIPPED | OUTPATIENT
Start: 2023-03-22 | End: 2023-04-01

## 2023-03-22 RX ORDER — BACITRACIN ZINC AND POLYMYXIN B SULFATE 500; 10000 [USP'U]/G; [USP'U]/G
OINTMENT OPHTHALMIC 3 TIMES DAILY
Qty: 3.5 G | Refills: 0 | Status: SHIPPED | OUTPATIENT
Start: 2023-03-22 | End: 2023-03-27

## 2023-03-22 NOTE — TELEPHONE ENCOUNTER
----- Message from Elvi Khan sent at 3/22/2023  3:09 PM CDT -----  Regarding: Schedule  Pt called about having pain in left eye with a red spot.     Call back-[phone]

## 2023-03-22 NOTE — LETTER
[address]  ? [place], [address] ? Phone [phone] ? Fax [phone]           Return to Work    Patient: Kandis Sosa  YOB: 2000   Date: 03/22/2023      To Whom It May Concern:     Kandis Sosa was in contact with/seen in my office on 03/22/2023. COVID-19 is present in our communities across the state. Not all patients are eligible or appropriate to be tested. In this situation, your employee meets the following criteria:     Kandis Sosa has met the criteria for COVID-19 testing based upon symptoms, travel, and/or potential exposure. The test has been completed and is pending results at this time. During this time the employee is not able to work and should be quarantined per the Centers for Disease Control timelines.      If you have any questions or concerns, or if I can be of further assistance, please do not hesitate to contact me.     Sincerely,    Neelima Bell MD

## 2023-03-22 NOTE — PROGRESS NOTES
Health Maintenance Due   Topic     Pneumococcal Vaccines (Age 0-64) (1 - PCV)     Pap Smear      TETANUS VACCINE

## 2023-03-22 NOTE — PROGRESS NOTES
Subjective:       Patient ID: Kandis Sosa is a pleasant 22 y.o. White female patient    Chief Complaint: Conjunctivitis, Cough (Had pink eye 5 months ago ), Nasal Congestion, and Generalized Body Aches (Started Sunday)      Patient is a new pt to me but is pt from Dr. Meyers, last visit in 11/2022 for conjunctivitis of L eye.    HPI     Comes today with subacute concern of the left eye redness, lateral aspect.    States it started a few days ago, and it has been worse during the weekend due to her having symptoms of upper respiratory infection, that started with sore throat on Sunday, with ticklish sensation.    She also presented with nasal congestion and cough that she states is productive, she has some mild shortness of breath but no wheezing, she denies any nausea,  vomiting, no headache, no fever, no issue with vision, no loss of taste and smell.  She took a home COVID test that came negative.    She has been using Flonase, antihistamine and saline nasal spray. Also NSAIDs.  No one is sick around her.    Patient Active Problem List   Diagnosis    Abdominal pain, generalized    Constipation - functional    Lactase deficiency    Dyschezia    Nausea    Abdominal pain    Attention deficit hyperactivity disorder (ADHD)    Depression    Postprandial nausea    S/P cholecystectomy          ACTIVE MEDICAL ISSUES:  Documented in Problem List     PAST MEDICAL HISTORY  Documented     PAST SURGICAL HISTORY:  Documented     SOCIAL HISTORY:  Documented     FAMILY HISTORY:  Documented     ALLERGIES AND MEDICATIONS: updated and reviewed.  Documented    Review of Systems   Constitutional:  Positive for chills (feeling hot and cold). Negative for fever.   HENT:  Positive for congestion, postnasal drip, rhinorrhea, sinus pressure and sore throat.    Respiratory:  Positive for cough and shortness of breath (mild). Negative for wheezing.    Cardiovascular: Negative.    Gastrointestinal: Negative.    All other systems reviewed  "and are negative.    Objective:      Physical Exam  Vitals and nursing note reviewed.   Constitutional:       Appearance: Normal appearance. She is normal weight.   HENT:      Right Ear: Tympanic membrane normal.      Left Ear: Tympanic membrane normal.      Mouth/Throat:      Pharynx: Posterior oropharyngeal erythema (mild, diffuse) present.   Eyes:     Neurological:      Mental Status: She is alert.       Vitals:    03/22/23 1351   BP: 100/68   BP Location: Right arm   Patient Position: Sitting   BP Method: Large (Automatic)   Pulse: 84   Resp: 16   Temp: 98.2 °F (36.8 °C)   TempSrc: Oral   SpO2: 99%   Weight: 53.6 kg (118 lb 2.7 oz)   Height: 5' 1" (1.549 m)     Body mass index is 22.33 kg/m².    RESULTS: Reviewed labs from last 12 months    Last Lab Results:     Lab Results   Component Value Date    WBC 4.68 07/27/2021    HGB 12.7 07/27/2021    HCT 39.1 07/27/2021     07/27/2021     07/27/2021    K 4.0 07/27/2021     07/27/2021    CO2 25 07/27/2021    BUN 9 07/27/2021    CREATININE 0.7 07/27/2021    CALCIUM 9.4 07/27/2021    ALBUMIN 4.5 05/18/2021    AST 19 05/18/2021    ALT 12 05/18/2021    CHOL 185 (H) 05/18/2021    TRIG 61 05/18/2021    HDL 74 05/18/2021    LDLCALC 96 05/18/2021    HGBA1C 5.5 08/19/2013    TSH 0.859 10/13/2017       Assessment:       1. Suspected COVID-19 virus infection    2. Acute cough    3. Sore throat    4. Conjunctivitis of left eye, unspecified conjunctivitis type        Plan:   Kandis was seen today for conjunctivitis, cough, nasal congestion and generalized body aches.    Diagnoses and all orders for this visit:    Suspected COVID-19 virus infection  -     COVID-19 Routine Screening    See HPI and PE. Will rule out COVID, will give symptomatic treatment, discussed quarantine. Letter provided for work.    Acute cough  -     COVID-19 Routine Screening    See above.    Sore throat  -     COVID-19 Routine Screening    Conjunctivitis of left eye, unspecified " conjunctivitis type    No symptoms or signs of concern. Had conjunctivitis several times, but not the same presentation. No vision issue. Can apply black tea bags to help for inflammation. Will try antibiotic ointment and if not improving, needs to see an Ophthalmologist.      No follow-ups on file.    This note was created by combination of typed  and M-Modal dictation.  Transcription errors may be present.  If there are any questions, please contact me.

## 2023-03-23 ENCOUNTER — TELEPHONE (OUTPATIENT)
Dept: FAMILY MEDICINE | Facility: CLINIC | Age: 23
End: 2023-03-23
Payer: COMMERCIAL

## 2023-03-23 ENCOUNTER — TELEPHONE (OUTPATIENT)
Dept: OPHTHALMOLOGY | Facility: CLINIC | Age: 23
End: 2023-03-23
Payer: COMMERCIAL

## 2023-03-23 LAB — SARS-COV-2 RNA RESP QL NAA+PROBE: NOT DETECTED

## 2023-03-23 NOTE — TELEPHONE ENCOUNTER
----- Message from Giselle Ang sent at 3/23/2023  9:36 AM CDT -----  Regarding: URGENT  Consult/Advisory    Name Of Caller:Kandis      Contact Preference:[phone]    Nature of call: Pt is having issues with her left eye she stated that it is red and she is experiencing pain. She also mentioned a lump has developed in the corner of her left eye

## 2023-03-23 NOTE — TELEPHONE ENCOUNTER
----- Message from Neelima Bell MD sent at 3/23/2023  1:28 PM CDT -----  Please contact the patient and let her know she has no COVID which is good. She can go back to work tomorrow.  Thanks!

## 2023-04-26 NOTE — TELEPHONE ENCOUNTER
----- Message from Richard Larson sent at 4/25/2023 10:34 AM CDT -----  Type: Needs Medical Advice  Who Called:  pt  Symptoms (please be specific):  pt said she just tested + for COVID and she need the office to call her something into the pharmacy--please call and advise  How long has patient had these symptoms:  Today  Pharmacy name and phone #:    [facility] #[id number] - [place], [place] - [address]  [address] [facility] [address]  Phone: [phone] Fax: [phone]  Best Call Back Number: [phone] (home)     Additional Information: thank you

## 2023-04-26 NOTE — TELEPHONE ENCOUNTER
----- Message from Jenise Berumen sent at 4/26/2023  3:17 PM CDT -----    Patient Returning Call        Who Called:ot  Does the patient know what this is regarding?:tested positive for covid-19 asking about meds to be called in  Would the patient rather a call back or a response via [facility]? call  Best Call Back Number:[phone]  Additional Information: call back

## 2023-04-27 RX ORDER — AMOXICILLIN AND CLAVULANATE POTASSIUM 875; 125 MG/1; MG/1
1 TABLET, FILM COATED ORAL
COMMUNITY
End: 2023-04-27 | Stop reason: SDUPTHER

## 2023-04-27 RX ORDER — AMOXICILLIN AND CLAVULANATE POTASSIUM 875; 125 MG/1; MG/1
1 TABLET, FILM COATED ORAL EVERY 12 HOURS
Qty: 20 TABLET | Refills: 0 | Status: SHIPPED | OUTPATIENT
Start: 2023-04-27 | End: 2023-11-30

## 2023-04-27 NOTE — TELEPHONE ENCOUNTER
Spoke with pt . Tested pos covid 24th has sinus achy head dry cough , no fever risk score 0 . Dr carney sent in augmentin to her pharm and try otc delsym and she has tessalon also to alternate.

## 2023-05-03 ENCOUNTER — PATIENT MESSAGE (OUTPATIENT)
Dept: ADMINISTRATIVE | Facility: HOSPITAL | Age: 23
End: 2023-05-03
Payer: COMMERCIAL

## 2023-05-04 ENCOUNTER — PATIENT MESSAGE (OUTPATIENT)
Dept: FAMILY MEDICINE | Facility: CLINIC | Age: 23
End: 2023-05-04
Payer: COMMERCIAL

## 2023-05-05 ENCOUNTER — TELEPHONE (OUTPATIENT)
Dept: FAMILY MEDICINE | Facility: CLINIC | Age: 23
End: 2023-05-05
Payer: COMMERCIAL

## 2023-05-05 ENCOUNTER — TELEPHONE (OUTPATIENT)
Dept: OPHTHALMOLOGY | Facility: CLINIC | Age: 23
End: 2023-05-05
Payer: COMMERCIAL

## 2023-05-05 NOTE — TELEPHONE ENCOUNTER
----- Message from Adelso Gutierrez sent at 5/5/2023  2:41 PM CDT -----  Type:  RX Refill Request    Who Called:  Patient  Refill or New Rx:  Refill  RX Name and Strength:  polymyxin B sulf-trimethoprim (POLYTRIM) 10,000 unit- 1 mg/mL Drop  How is the patient currently taking it? (ex. 1XDay):   Place 1 drop into the left eye every 6 (six) hours. - Left Eye  Is this a 30 day or 90 day RX:    Preferred Pharmacy with phone number:        [id number] - [place], [place] - [address]  [address] [facility] [address]  Phone: [phone] Fax: [phone]        Local or Mail Order:  Local  Ordering Provider:  Mira Burrell Call Back Number:  [phone]  Additional Information:

## 2023-05-05 NOTE — TELEPHONE ENCOUNTER
----- Message from Rosalina Sterling sent at 5/5/2023  4:19 PM CDT -----  Regarding: urgent Appt  Contact: Pt  Pt is requesting a callback in regards to scheduling an urgent appt. Pt stated she powell an reoccurring eye infection. Pt went to Urgent care and was informed she need to see an eye doctor. Please adv         Confirmed contact below:   Contact Name:Kandis Sosa  Phone Number: [phone]

## 2023-05-10 ENCOUNTER — OFFICE VISIT (OUTPATIENT)
Dept: OPTOMETRY | Facility: CLINIC | Age: 23
End: 2023-05-10
Payer: COMMERCIAL

## 2023-05-10 DIAGNOSIS — H15.102 EPISCLERITIS OF LEFT EYE: Primary | ICD-10-CM

## 2023-05-10 DIAGNOSIS — H57.89 IRRITATION OF LEFT EYE: ICD-10-CM

## 2023-05-10 PROCEDURE — 1159F MED LIST DOCD IN RCRD: CPT | Mod: CPTII,S$GLB,, | Performed by: OPTOMETRIST

## 2023-05-10 PROCEDURE — 1160F PR REVIEW ALL MEDS BY PRESCRIBER/CLIN PHARMACIST DOCUMENTED: ICD-10-PCS | Mod: CPTII,S$GLB,, | Performed by: OPTOMETRIST

## 2023-05-10 PROCEDURE — 99203 OFFICE O/P NEW LOW 30 MIN: CPT | Mod: S$GLB,,, | Performed by: OPTOMETRIST

## 2023-05-10 PROCEDURE — 1159F PR MEDICATION LIST DOCUMENTED IN MEDICAL RECORD: ICD-10-PCS | Mod: CPTII,S$GLB,, | Performed by: OPTOMETRIST

## 2023-05-10 PROCEDURE — 99999 PR PBB SHADOW E&M-EST. PATIENT-LVL II: CPT | Mod: PBBFAC,,, | Performed by: OPTOMETRIST

## 2023-05-10 PROCEDURE — 99203 PR OFFICE/OUTPT VISIT, NEW, LEVL III, 30-44 MIN: ICD-10-PCS | Mod: S$GLB,,, | Performed by: OPTOMETRIST

## 2023-05-10 PROCEDURE — 1160F RVW MEDS BY RX/DR IN RCRD: CPT | Mod: CPTII,S$GLB,, | Performed by: OPTOMETRIST

## 2023-05-10 PROCEDURE — 99999 PR PBB SHADOW E&M-EST. PATIENT-LVL II: ICD-10-PCS | Mod: PBBFAC,,, | Performed by: OPTOMETRIST

## 2023-05-10 RX ORDER — PREDNISOLONE ACETATE 10 MG/ML
1 SUSPENSION/ DROPS OPHTHALMIC
Qty: 5 ML | Refills: 1 | Status: SHIPPED | OUTPATIENT
Start: 2023-05-10 | End: 2023-05-24

## 2023-05-10 NOTE — PROGRESS NOTES
HPI    Pt here today for reoccurring eye infection OS on & off since November 2022.      Was seen at urgent care and treated for pink eye -- itching, tearing,   burning.   Treated with antibiotic gtts but did not relieve symptoms.    Pt then saw pcp and was put on anti-viral gtts which cleared symptoms.       No labs ordered.    Pt has had 3 episodes in last 6 months.    OS is very red today with pain.   Feels like a sore muscle, worse with   blinking and eye movement.  3/10 pain scale.    (+) light sensitive   (+) tearing  (+) blurred vision due to eye watering    Not using anything in eye now    This episode started 5 days ago, woke up with it this red  Last edited by Ace Tipton, OD on 5/10/2023  9:43 AM.            Assessment /Plan     For exam results, see Encounter Report.    Episcleritis of left eye  -     prednisoLONE acetate (PRED FORTE) 1 % DrpS; Place 1 drop into the left eye 6 (six) times daily. for 14 days  Dispense: 5 mL; Refill: 1    Irritation of left eye      Discussed findings and possible etiologies  Start PF 1%: 1 gt six times daily OS, shake well before use   Start otc Systane: 1 gt 4-6 times daily OS  Return in 1 week for f/u, sooner if any worsening

## 2023-05-16 ENCOUNTER — OFFICE VISIT (OUTPATIENT)
Dept: OPTOMETRY | Facility: CLINIC | Age: 23
End: 2023-05-16
Payer: COMMERCIAL

## 2023-05-16 DIAGNOSIS — H57.89 IRRITATION OF LEFT EYE: ICD-10-CM

## 2023-05-16 DIAGNOSIS — H15.102 EPISCLERITIS OF LEFT EYE: Primary | ICD-10-CM

## 2023-05-16 PROCEDURE — 99213 PR OFFICE/OUTPT VISIT, EST, LEVL III, 20-29 MIN: ICD-10-PCS | Mod: S$GLB,,, | Performed by: OPTOMETRIST

## 2023-05-16 PROCEDURE — 1160F RVW MEDS BY RX/DR IN RCRD: CPT | Mod: CPTII,S$GLB,, | Performed by: OPTOMETRIST

## 2023-05-16 PROCEDURE — 99999 PR PBB SHADOW E&M-EST. PATIENT-LVL II: CPT | Mod: PBBFAC,,, | Performed by: OPTOMETRIST

## 2023-05-16 PROCEDURE — 1159F MED LIST DOCD IN RCRD: CPT | Mod: CPTII,S$GLB,, | Performed by: OPTOMETRIST

## 2023-05-16 PROCEDURE — 1159F PR MEDICATION LIST DOCUMENTED IN MEDICAL RECORD: ICD-10-PCS | Mod: CPTII,S$GLB,, | Performed by: OPTOMETRIST

## 2023-05-16 PROCEDURE — 1160F PR REVIEW ALL MEDS BY PRESCRIBER/CLIN PHARMACIST DOCUMENTED: ICD-10-PCS | Mod: CPTII,S$GLB,, | Performed by: OPTOMETRIST

## 2023-05-16 PROCEDURE — 99999 PR PBB SHADOW E&M-EST. PATIENT-LVL II: ICD-10-PCS | Mod: PBBFAC,,, | Performed by: OPTOMETRIST

## 2023-05-16 PROCEDURE — 99213 OFFICE O/P EST LOW 20 MIN: CPT | Mod: S$GLB,,, | Performed by: OPTOMETRIST

## 2023-05-16 NOTE — PROGRESS NOTES
HPI    Pt here for 1 wk episcleritis f/u. Pt states OS doing much better. OS   still red and sometimes watery but no pain, vision stable.     +pred forte 6x a day OS, systane ~ 3-4x a day.   Last edited by Ace Tipton, OD on 5/16/2023 10:16 AM.            Assessment /Plan     For exam results, see Encounter Report.    Episcleritis of left eye    Irritation of left eye      Much improved, trace injection and chemosis remaining  Continue PF 6 x a day OS until resolved -- will taper slowly once cleared  Continue otc ATs 3-4 times daily  F/u in 1 week, sooner if any worsening

## 2023-06-21 ENCOUNTER — PATIENT MESSAGE (OUTPATIENT)
Dept: ADMINISTRATIVE | Facility: HOSPITAL | Age: 23
End: 2023-06-21
Payer: COMMERCIAL

## 2023-07-25 ENCOUNTER — PATIENT MESSAGE (OUTPATIENT)
Dept: OPTOMETRY | Facility: CLINIC | Age: 23
End: 2023-07-25
Payer: COMMERCIAL

## 2023-07-27 ENCOUNTER — LAB VISIT (OUTPATIENT)
Dept: LAB | Facility: HOSPITAL | Age: 23
End: 2023-07-27
Attending: OPTOMETRIST
Payer: COMMERCIAL

## 2023-07-27 ENCOUNTER — OFFICE VISIT (OUTPATIENT)
Dept: OPTOMETRY | Facility: CLINIC | Age: 23
End: 2023-07-27
Payer: COMMERCIAL

## 2023-07-27 DIAGNOSIS — H57.89 IRRITATION OF LEFT EYE: ICD-10-CM

## 2023-07-27 DIAGNOSIS — H15.102 EPISCLERITIS OF LEFT EYE: ICD-10-CM

## 2023-07-27 DIAGNOSIS — H15.102 EPISCLERITIS OF LEFT EYE: Primary | ICD-10-CM

## 2023-07-27 LAB
RHEUMATOID FACT SERPL-ACNC: <13 IU/ML (ref 0–15)
URATE SERPL-MCNC: 5 MG/DL (ref 2.4–5.7)

## 2023-07-27 PROCEDURE — 86592 SYPHILIS TEST NON-TREP QUAL: CPT | Performed by: OPTOMETRIST

## 2023-07-27 PROCEDURE — 86235 NUCLEAR ANTIGEN ANTIBODY: CPT | Mod: 59 | Performed by: OPTOMETRIST

## 2023-07-27 PROCEDURE — 85651 RBC SED RATE NONAUTOMATED: CPT | Mod: PO | Performed by: OPTOMETRIST

## 2023-07-27 PROCEDURE — 99999 PR PBB SHADOW E&M-EST. PATIENT-LVL III: CPT | Mod: PBBFAC,,, | Performed by: OPTOMETRIST

## 2023-07-27 PROCEDURE — 1160F PR REVIEW ALL MEDS BY PRESCRIBER/CLIN PHARMACIST DOCUMENTED: ICD-10-PCS | Mod: CPTII,S$GLB,, | Performed by: OPTOMETRIST

## 2023-07-27 PROCEDURE — 86036 ANCA SCREEN EACH ANTIBODY: CPT | Performed by: OPTOMETRIST

## 2023-07-27 PROCEDURE — 86780 TREPONEMA PALLIDUM: CPT | Performed by: OPTOMETRIST

## 2023-07-27 PROCEDURE — 99214 PR OFFICE/OUTPT VISIT, EST, LEVL IV, 30-39 MIN: ICD-10-PCS | Mod: S$GLB,,, | Performed by: OPTOMETRIST

## 2023-07-27 PROCEDURE — 1159F MED LIST DOCD IN RCRD: CPT | Mod: CPTII,S$GLB,, | Performed by: OPTOMETRIST

## 2023-07-27 PROCEDURE — 86039 ANTINUCLEAR ANTIBODIES (ANA): CPT | Performed by: OPTOMETRIST

## 2023-07-27 PROCEDURE — 86431 RHEUMATOID FACTOR QUANT: CPT | Performed by: OPTOMETRIST

## 2023-07-27 PROCEDURE — 99999 PR PBB SHADOW E&M-EST. PATIENT-LVL III: ICD-10-PCS | Mod: PBBFAC,,, | Performed by: OPTOMETRIST

## 2023-07-27 PROCEDURE — 99214 OFFICE O/P EST MOD 30 MIN: CPT | Mod: S$GLB,,, | Performed by: OPTOMETRIST

## 2023-07-27 PROCEDURE — 1159F PR MEDICATION LIST DOCUMENTED IN MEDICAL RECORD: ICD-10-PCS | Mod: CPTII,S$GLB,, | Performed by: OPTOMETRIST

## 2023-07-27 PROCEDURE — 36415 COLL VENOUS BLD VENIPUNCTURE: CPT | Mod: PO | Performed by: OPTOMETRIST

## 2023-07-27 PROCEDURE — 84550 ASSAY OF BLOOD/URIC ACID: CPT | Performed by: OPTOMETRIST

## 2023-07-27 PROCEDURE — 86038 ANTINUCLEAR ANTIBODIES: CPT | Performed by: OPTOMETRIST

## 2023-07-27 PROCEDURE — 1160F RVW MEDS BY RX/DR IN RCRD: CPT | Mod: CPTII,S$GLB,, | Performed by: OPTOMETRIST

## 2023-07-27 RX ORDER — PREDNISOLONE ACETATE 10 MG/ML
1 SUSPENSION/ DROPS OPHTHALMIC
Qty: 5 ML | Refills: 1 | Status: SHIPPED | OUTPATIENT
Start: 2023-07-27 | End: 2023-08-10

## 2023-07-27 NOTE — PROGRESS NOTES
HPI    23 YO female presents today for an eye problem. Patient states that on   Monday she noticed OS getting red and over the past couple of days it has   worsened and became painful when blinking. Notes that OS has always stayed   slightly red since last flare up.   Last edited by Kassidy Roldan on 7/27/2023  2:15 PM.            Assessment /Plan     For exam results, see Encounter Report.    Episcleritis of left eye  -     JULITA; Future; Expected date: 07/27/2023  -     RHEUMATOID FACTOR; Future; Expected date: 07/27/2023  -     Sedimentation rate; Future; Expected date: 07/27/2023  -     URIC ACID; Future; Expected date: 07/27/2023  -     RPR; Future; Expected date: 07/27/2023  -     FTA ANTIBODIES, IGG AND IGM; Future; Expected date: 07/27/2023  -     ANTI-NEUTROPHILIC CYTOPLASMIC ANTIBODY; Future; Expected date: 07/27/2023  -     prednisoLONE acetate (PRED FORTE) 1 % DrpS; Place 1 drop into the left eye 6 (six) times daily. for 14 days  Dispense: 5 mL; Refill: 1    Irritation of left eye      Nodular episcleritis temporal OS -- blanched with phenyl 2.5% in office after 10-15 minutes  Re-occurrence vs rebound effect, previous lost to f/u, steroid drops d/c with quick taper per pt  Discussed possible etiologies, ordered blood work  Restart PF 1%: 1 gt 6x per day OS, shake well  Restart OTC ATs throughout day OS  Return for f/u in 2 weeks -- pt prefers provider in [place], difficult transportation to [place], scheduled with Dr. Reyes

## 2023-07-28 ENCOUNTER — TELEPHONE (OUTPATIENT)
Dept: OPTOMETRY | Facility: CLINIC | Age: 23
End: 2023-07-28
Payer: COMMERCIAL

## 2023-07-28 DIAGNOSIS — R76.8 ANA POSITIVE: Primary | ICD-10-CM

## 2023-07-28 LAB
ANA PATTERN 1: NORMAL
ANA SER QL IF: POSITIVE
ANA TITR SER IF: NORMAL {TITER}
ERYTHROCYTE [SEDIMENTATION RATE] IN BLOOD BY WESTERGREN METHOD: 1 MM/HR (ref 0–20)
RPR SER QL: NORMAL
T PALLIDUM AB SER QL IF: NORMAL

## 2023-07-31 LAB
ANCA AB TITR SER IF: NORMAL TITER
P-ANCA TITR SER IF: NORMAL TITER

## 2023-08-01 ENCOUNTER — TELEPHONE (OUTPATIENT)
Dept: OPTOMETRY | Facility: CLINIC | Age: 23
End: 2023-08-01
Payer: COMMERCIAL

## 2023-08-01 LAB
ANTI SM ANTIBODY: 0.07 RATIO (ref 0–0.99)
ANTI SM/RNP ANTIBODY: 0.3 RATIO (ref 0–0.99)
ANTI-SM INTERPRETATION: NEGATIVE
ANTI-SM/RNP INTERPRETATION: NEGATIVE
ANTI-SSA ANTIBODY: 0.39 RATIO (ref 0–0.99)
ANTI-SSA INTERPRETATION: NEGATIVE
ANTI-SSB ANTIBODY: 0.11 RATIO (ref 0–0.99)
ANTI-SSB INTERPRETATION: NEGATIVE
DSDNA AB SER-ACNC: NORMAL [IU]/ML

## 2023-08-01 NOTE — TELEPHONE ENCOUNTER
Spoke to pt regarding blood work results.  Reports eye doing better, continue drops as directed, keep f/u with Dr. Reyes.

## 2023-08-10 ENCOUNTER — PATIENT MESSAGE (OUTPATIENT)
Dept: ADMINISTRATIVE | Facility: HOSPITAL | Age: 23
End: 2023-08-10
Payer: COMMERCIAL

## 2023-08-10 ENCOUNTER — OFFICE VISIT (OUTPATIENT)
Dept: OPTOMETRY | Facility: CLINIC | Age: 23
End: 2023-08-10
Payer: COMMERCIAL

## 2023-08-10 DIAGNOSIS — H15.102 EPISCLERITIS OF LEFT EYE: Primary | ICD-10-CM

## 2023-08-10 PROCEDURE — 99999 PR PBB SHADOW E&M-EST. PATIENT-LVL III: ICD-10-PCS | Mod: PBBFAC,,, | Performed by: OPTOMETRIST

## 2023-08-10 PROCEDURE — 1159F PR MEDICATION LIST DOCUMENTED IN MEDICAL RECORD: ICD-10-PCS | Mod: CPTII,S$GLB,, | Performed by: OPTOMETRIST

## 2023-08-10 PROCEDURE — 1159F MED LIST DOCD IN RCRD: CPT | Mod: CPTII,S$GLB,, | Performed by: OPTOMETRIST

## 2023-08-10 PROCEDURE — 92012 INTRM OPH EXAM EST PATIENT: CPT | Mod: S$GLB,,, | Performed by: OPTOMETRIST

## 2023-08-10 PROCEDURE — 99999 PR PBB SHADOW E&M-EST. PATIENT-LVL III: CPT | Mod: PBBFAC,,, | Performed by: OPTOMETRIST

## 2023-08-10 PROCEDURE — 92012 PR EYE EXAM, EST PATIENT,INTERMED: ICD-10-PCS | Mod: S$GLB,,, | Performed by: OPTOMETRIST

## 2023-08-10 NOTE — PATIENT INSTRUCTIONS
Recent history of recurrenrt episcleritis in the left eye.  Currently using Prednisolone Acetate 1.0% ophthalmic suspension six times per day in the left eye only.  Both eyes appear normal/quiet.    Reduce drops to four times per day in the left eye until seen again.  Return in two weeks - or prior if any apparent exacerbation of signs/symptoms in the interim

## 2023-08-10 NOTE — PROGRESS NOTES
HPI     Follow-up            Comments: In for follow-up on episcleritis of the left eye.  Second   episode.  Saw Dr. Tipton at [facility].  Blood work done.  Scheduled to see rheumatologist in December, 2023.  Using Prednisolone Acetate ophthalmic suspension in the left eye six times   per day.  Eye feels fine today.  Uses glasses for distance viewing, but did not bring clear lenses with her   to visit today           Comments    Patient in for progress check.    Being followed for (diagnosis):   Episcleritis of the left eye, and recent   Stye on lower OS eye in the corner.     Date last seen:  07/27/2023    Doctor last seen:  Dr. Tipton at [facility]    Prescribed eye medications(s) using:  Prednisolone Acetate six imes per   day in the left eye.  Second  episode.    No Known Drug Allergies.  History of stomach problems, but no other history of health problems   noted.     OTC eye medication(s) using:  Systane     Signs/symptoms of condition resolved/better/stable/worse?:  Symptoms   ongoing. Bump appears to be red and irritated.                       Last edited by Pedro Luis Reyes, OD on 8/10/2023  9:01 AM.            Assessment /Plan     For exam results, see Encounter Report.    1. Episcleritis of left eye                     Recent history of recurrenrt episcleritis in the left eye.  Currently using Prednisolone Acetate 1.0% ophthalmic suspension six times per day in the left eye only.  Both eyes appear normal/quiet.    Reduce drops to four times per day in the left eye until seen again.  Return in two weeks - or prior if any apparent exacerbation of signs/symptoms in the interim

## 2023-08-21 ENCOUNTER — OFFICE VISIT (OUTPATIENT)
Dept: OPTOMETRY | Facility: CLINIC | Age: 23
End: 2023-08-21
Payer: COMMERCIAL

## 2023-08-21 DIAGNOSIS — H15.102 EPISCLERITIS OF LEFT EYE: Primary | ICD-10-CM

## 2023-08-21 PROCEDURE — 99999 PR PBB SHADOW E&M-EST. PATIENT-LVL III: CPT | Mod: PBBFAC,,, | Performed by: OPTOMETRIST

## 2023-08-21 PROCEDURE — 1159F PR MEDICATION LIST DOCUMENTED IN MEDICAL RECORD: ICD-10-PCS | Mod: CPTII,S$GLB,, | Performed by: OPTOMETRIST

## 2023-08-21 PROCEDURE — 1159F MED LIST DOCD IN RCRD: CPT | Mod: CPTII,S$GLB,, | Performed by: OPTOMETRIST

## 2023-08-21 PROCEDURE — 92012 PR EYE EXAM, EST PATIENT,INTERMED: ICD-10-PCS | Mod: S$GLB,,, | Performed by: OPTOMETRIST

## 2023-08-21 PROCEDURE — 99999 PR PBB SHADOW E&M-EST. PATIENT-LVL III: ICD-10-PCS | Mod: PBBFAC,,, | Performed by: OPTOMETRIST

## 2023-08-21 PROCEDURE — 92012 INTRM OPH EXAM EST PATIENT: CPT | Mod: S$GLB,,, | Performed by: OPTOMETRIST

## 2023-08-21 NOTE — PROGRESS NOTES
HPI      Patient in for progress check.    Being followed for (diagnosis):   Episcleritis OS f/u   Now using pred forte four times per day in the left eye only  Ms. Sosa reports no problems, no indication of redness or discomfort in   either eye     Date last seen:  08/10/2023    Doctor last seen:  Dr. Reyes    Prescribed eye medications(s) using:  Pred Forte     OTC eye medication(s) using:  No    Signs/symptoms of condition resolved/better/stable/worse?:  Better, no red   eye.                          Last edited by Pedro Luis Reyes OD on 8/21/2023  9:14 AM.            Assessment /Plan     For exam results, see Encounter Report.    1. Episcleritis of left eye                     Recent history of recurrenrt episcleritis in the left eye.  Currently using Prednisolone Acetate 1.0% ophthalmic suspension four times per day in the left eye only.  Both eyes appear normal/quiet.     Taper use of Prednisolone Acetate drops in OS:  twice per day for one week and then  once per day for one week, and then discontinue.    Call/return if she notes any recurrence of signs/symptoms as she tapers use of drops or after she discontinues use of the drops.  Otherwise, keep appointment in rheumatology as scheduled, and return for general eye exam and refraction when appropriate.

## 2023-08-21 NOTE — PATIENT INSTRUCTIONS
Recent history of recurrenrt episcleritis in the left eye.  Currently using Prednisolone Acetate 1.0% ophthalmic suspension four times per day in the left eye only.  Both eyes appear normal/quiet.     Taper use of Prednisolone Acetate drops in OS:  twice per day for one week and then  once per day for one week, and then discontinue.    Call/return if she notes any recurrence of signs/symptoms as she tapers use of drops or after she discontinues use of the drops.  Otherwise, keep appointment in rheumatology as scheduled, and return for general eye exam and refraction when appropriate.

## 2023-08-24 LAB
PAP RECOMMENDATION EXT: NORMAL
PAP SMEAR: NORMAL

## 2023-08-31 ENCOUNTER — PATIENT MESSAGE (OUTPATIENT)
Dept: ADMINISTRATIVE | Facility: HOSPITAL | Age: 23
End: 2023-08-31
Payer: COMMERCIAL

## 2023-09-14 ENCOUNTER — PATIENT MESSAGE (OUTPATIENT)
Dept: ADMINISTRATIVE | Facility: HOSPITAL | Age: 23
End: 2023-09-14
Payer: COMMERCIAL

## 2023-09-18 ENCOUNTER — PATIENT OUTREACH (OUTPATIENT)
Dept: ADMINISTRATIVE | Facility: HOSPITAL | Age: 23
End: 2023-09-18
Payer: COMMERCIAL

## 2023-11-02 ENCOUNTER — PATIENT MESSAGE (OUTPATIENT)
Dept: FAMILY MEDICINE | Facility: CLINIC | Age: 23
End: 2023-11-02
Payer: COMMERCIAL

## 2023-11-02 ENCOUNTER — TELEPHONE (OUTPATIENT)
Dept: FAMILY MEDICINE | Facility: CLINIC | Age: 23
End: 2023-11-02
Payer: COMMERCIAL

## 2023-11-30 ENCOUNTER — OFFICE VISIT (OUTPATIENT)
Dept: RHEUMATOLOGY | Facility: CLINIC | Age: 23
End: 2023-11-30
Payer: COMMERCIAL

## 2023-11-30 ENCOUNTER — TELEPHONE (OUTPATIENT)
Dept: ADMINISTRATIVE | Facility: OTHER | Age: 23
End: 2023-11-30
Payer: COMMERCIAL

## 2023-11-30 VITALS
BODY MASS INDEX: 22.54 KG/M2 | WEIGHT: 119.38 LBS | HEIGHT: 61 IN | DIASTOLIC BLOOD PRESSURE: 68 MMHG | SYSTOLIC BLOOD PRESSURE: 100 MMHG

## 2023-11-30 DIAGNOSIS — K52.9 CHRONIC DIARRHEA: ICD-10-CM

## 2023-11-30 DIAGNOSIS — H15.102 EPISCLERITIS OF LEFT EYE: Primary | ICD-10-CM

## 2023-11-30 DIAGNOSIS — R76.8 ANA POSITIVE: ICD-10-CM

## 2023-11-30 PROCEDURE — 99999 PR PBB SHADOW E&M-EST. PATIENT-LVL IV: ICD-10-PCS | Mod: PBBFAC,,, | Performed by: STUDENT IN AN ORGANIZED HEALTH CARE EDUCATION/TRAINING PROGRAM

## 2023-11-30 PROCEDURE — 99999 PR PBB SHADOW E&M-EST. PATIENT-LVL IV: CPT | Mod: PBBFAC,,, | Performed by: STUDENT IN AN ORGANIZED HEALTH CARE EDUCATION/TRAINING PROGRAM

## 2023-11-30 PROCEDURE — 3008F BODY MASS INDEX DOCD: CPT | Mod: CPTII,S$GLB,, | Performed by: STUDENT IN AN ORGANIZED HEALTH CARE EDUCATION/TRAINING PROGRAM

## 2023-11-30 PROCEDURE — 1159F MED LIST DOCD IN RCRD: CPT | Mod: CPTII,S$GLB,, | Performed by: STUDENT IN AN ORGANIZED HEALTH CARE EDUCATION/TRAINING PROGRAM

## 2023-11-30 PROCEDURE — 3074F SYST BP LT 130 MM HG: CPT | Mod: CPTII,S$GLB,, | Performed by: STUDENT IN AN ORGANIZED HEALTH CARE EDUCATION/TRAINING PROGRAM

## 2023-11-30 PROCEDURE — 3078F DIAST BP <80 MM HG: CPT | Mod: CPTII,S$GLB,, | Performed by: STUDENT IN AN ORGANIZED HEALTH CARE EDUCATION/TRAINING PROGRAM

## 2023-11-30 PROCEDURE — 99204 OFFICE O/P NEW MOD 45 MIN: CPT | Mod: S$GLB,,, | Performed by: STUDENT IN AN ORGANIZED HEALTH CARE EDUCATION/TRAINING PROGRAM

## 2023-11-30 PROCEDURE — 1159F PR MEDICATION LIST DOCUMENTED IN MEDICAL RECORD: ICD-10-PCS | Mod: CPTII,S$GLB,, | Performed by: STUDENT IN AN ORGANIZED HEALTH CARE EDUCATION/TRAINING PROGRAM

## 2023-11-30 PROCEDURE — 3074F PR MOST RECENT SYSTOLIC BLOOD PRESSURE < 130 MM HG: ICD-10-PCS | Mod: CPTII,S$GLB,, | Performed by: STUDENT IN AN ORGANIZED HEALTH CARE EDUCATION/TRAINING PROGRAM

## 2023-11-30 PROCEDURE — 99204 PR OFFICE/OUTPT VISIT, NEW, LEVL IV, 45-59 MIN: ICD-10-PCS | Mod: S$GLB,,, | Performed by: STUDENT IN AN ORGANIZED HEALTH CARE EDUCATION/TRAINING PROGRAM

## 2023-11-30 PROCEDURE — 3078F PR MOST RECENT DIASTOLIC BLOOD PRESSURE < 80 MM HG: ICD-10-PCS | Mod: CPTII,S$GLB,, | Performed by: STUDENT IN AN ORGANIZED HEALTH CARE EDUCATION/TRAINING PROGRAM

## 2023-11-30 PROCEDURE — 3008F PR BODY MASS INDEX (BMI) DOCUMENTED: ICD-10-PCS | Mod: CPTII,S$GLB,, | Performed by: STUDENT IN AN ORGANIZED HEALTH CARE EDUCATION/TRAINING PROGRAM

## 2023-11-30 RX ORDER — CLASCOTERONE 1 G/100G
1 CREAM TOPICAL 2 TIMES DAILY
COMMUNITY
Start: 2023-08-28

## 2023-11-30 RX ORDER — OXYMETAZOLINE HYDROCHLORIDE 1 G/100G
CREAM TOPICAL
COMMUNITY
Start: 2023-07-13

## 2023-11-30 RX ORDER — BRIMONIDINE TARTRATE 5 MG/G
GEL TOPICAL 2 TIMES DAILY
COMMUNITY
Start: 2023-07-14

## 2023-11-30 NOTE — PATIENT INSTRUCTIONS
Reach to GI due to chronic diarrhea, poor apetite. Also let them know that you have diagnosis of nodular episcleritis and want to make sure is not IBD related.     Do labs now

## 2023-11-30 NOTE — PROGRESS NOTES
RHEUMATOLOGY OUTPATIENT CLINIC NOTE    11/30/2023    Attending Rheumatologist: Susana Reyez  Primary Care Provider: Ramon Meyers III, MD   Physician Requesting Consultation: Ace Tipton [address]  [address]  [place]  [place] [address]  Chief Complaint/Reason For Consultation:  Positive JULITA      Subjective:       HPI  Kandis oSsa is a 23 y.o. White female with no past medical history who comes for evaluation of positive JULITA.  Comes with mother Gloria    In November 2022, she started having left pink eye, pain with eye movements, light sensitivity, at least once a month.   She was then evaluated by optometry in May 2023, diagnosed with left eye episcleritis.  Started on prednisolone drops.  Last episode of episcleritis was 3 months ago.  She has prednisolone drop at home and has uses 3 times for the past 3 months only.    Had workup for systemic causes which showed JULITA 1:160 homogeneous with negative sub serologies, negative RF, ESR normal, RPR and FTA negative, ANCA negative.  She was referred to Rheumatology for positive JULITA.    She does endorse other symptoms such as constant fatigue, chronic diarrhea with 3-4 watery episodes daily, poor appetite, diffuse pain primarily in her low back and thighs, frontal headaches that radiates to the temple, recurrent sinus infections.    She denies any skin rashes, oral ulcers, Raynaud's, chest pain, shortness of breath, alopecia    She used to smoke can be but stopped.  Drinks alcohol socially periods does not use any drugs.    She denies any family history of autoimmune conditions that she is aware of.    Chronic comorbid conditions affecting medical decision making today:  Past Medical History:   Diagnosis Date    Attention deficit hyperactivity disorder (ADHD) 12/22/2020    Depression 12/22/2020    GERD (gastroesophageal reflux disease)     Stomach discomfort     gets stomach aches alot because she cnt digest her food. worked up by  Dr. Velásquez 3 years ago--diagnosed with slow transition she has not follewd up since doctor .     Past Surgical History:   Procedure Laterality Date    ESOPHAGOGASTRODUODENOSCOPY      ESOPHAGOGASTRODUODENOSCOPY N/A 2020    Procedure: EGD (ESOPHAGOGASTRODUODENOSCOPY);  Surgeon: Veto Glover MD;  Location[facility];  Service: Endoscopy;  Laterality: N/A;    LAPAROSCOPIC CHOLECYSTECTOMY N/A 2021    Procedure: CHOLECYSTECTOMY, LAPAROSCOPIC;  Surgeon: René Pérez III, MD;  Location: [facility] OR;  Service: General;  Laterality: N/A;    MANDIBLE SURGERY      TEAR DUCT SURGERY       Family History   Problem Relation Age of Onset    ADD / ADHD Father         never diagnosed    Anxiety disorder Father         never diagnosed    Cancer Father         Neuroendocrine    Suicide Cousin         father's brothers son, he shot himself after his father  he had a drug addiction    Breast cancer Maternal Grandmother     Kidney cancer Maternal Grandmother      Social History     Substance and Sexual Activity   Alcohol Use No     Social History     Tobacco Use   Smoking Status Former    Types: Vaping with nicotine    Start date:     Quit date: 2023    Years since quittin.4   Smokeless Tobacco Never   Tobacco Comments    OCCASIONAL      Social History     Substance and Sexual Activity   Drug Use No       Current Outpatient Medications:     MIRVASO 0.33 % GlwP, Apply topically 2 (two) times daily., Disp: , Rfl:     RHOFADE 1 % Crea, Apply topically., Disp: , Rfl:     WINLEVI 1 % Crea, 1 application  2 (two) times daily. Apply to face, Disp: , Rfl:     amoxicillin-clavulanate 875-125mg (AUGMENTIN) 875-125 mg per tablet, Take 1 tablet by mouth every 12 (twelve) hours., Disp: 20 tablet, Rfl: 0    cetirizine (ZYRTEC) 10 MG tablet, Take 10 mg by mouth every evening., Disp: , Rfl:     fluticasone propionate (FLONASE) 50 mcg/actuation nasal spray, SMARTSI Spray(s) Both Nares Every 12 Hours, Disp: , Rfl:      "ibuprofen (ADVIL,MOTRIN) 600 MG tablet, Take 600 mg by mouth every 8 (eight) hours as needed., Disp: , Rfl:     methylPREDNISolone (MEDROL DOSEPACK) 4 mg tablet, use as directed, Disp: 1 each, Rfl: 0     Objective:         Vitals:    11/30/23 1602   BP: 100/68     Physical Exam  No synovitis on examination.  No tenderness in muscles  Subtle subconjunctival injection in left eye.  Normal salivary pool    Reviewed old and all outside pertinent medical records available.    All lab results personally reviewed and interpreted by me.  Lab Results   Component Value Date    WBC 4.68 07/27/2021    HGB 12.7 07/27/2021    HCT 39.1 07/27/2021    MCV 93 07/27/2021    MCH 30.1 07/27/2021    MCHC 32.5 07/27/2021    RDW 13.2 07/27/2021     07/27/2021    MPV 9.9 07/27/2021    NEUTROABS 1,693 05/18/2021       Lab Results   Component Value Date     07/27/2021    K 4.0 07/27/2021     07/27/2021    CO2 25 07/27/2021    GLU 79 07/27/2021    BUN 9 07/27/2021    CALCIUM 9.4 07/27/2021    PROT 6.8 05/18/2021    ALBUMIN 4.5 05/18/2021    BILITOT 0.5 05/18/2021    AST 19 05/18/2021    ALKPHOS 64 06/29/2020    ALT 12 05/18/2021       Lab Results   Component Value Date    COLORU yellow clear 09/21/2016    SPECGRAV >=1.030 11/02/2020    PHUR 8.5 11/02/2020    KETONESU negative 11/02/2020    NITRITE negative 11/02/2020    UROBILINOGEN 1.0 E.U./dL 11/02/2020       Lab Results   Component Value Date    CRP 0.4 08/19/2017       Lab Results   Component Value Date    RF <13.0 07/27/2023    SEDRATE 1 07/27/2023       No components found for: "25OHVITDTOT", "62OLYQHF0", "77BNIUXX3", "METHODNOTE"    Lab Results   Component Value Date    URICACID 5.0 07/27/2023       Lab Results   Component Value Date    HEPCAB NON-REACTIVE 11/11/2021       Imaging:  All imaging reviewed and independently interpreted by me.     ASSESSMENT / PLAN:     Kandis Sosa is a 23 y.o. White female with no past medical history who comes for evaluation of " positive JULITA, checked in the setting of recurrent left eye episcleritis    1. JULITA positive  -JULITA 1:160 homogeneous with negative sub serologies  -no specific symptoms for lupus, Sjogren's, scleroderma.    2. Episcleritis of left eye  -recurrent episodes of nodular episcleritis in the left eye since November 2022.  Has require steroid drops.  No flare since the past 3 months  -workup so far shows negative RPR, FTA antibodies, ANCA, RF  -Will obtain further workup with CCP, ACE, ESR, SRP, immunoglobulin, CBC, CMP.   -discussed with patient that episcleritis can be associated with systemic conditions however the majority of the cases of episcleritis are idiopathic, with some requiring immunosuppression.  We will let optometry/ophthalmology determine if she needs that and if so I can monitor it.      3. Chronic diarrhea  -Referral to GI.   -may need colonoscopy/EGD rule out IBD as it can be associated with episcleritis.  -we will obtain vitamin-D, B12, iron panel, T4, TSH    Follow up in about 2 months (around 1/30/2024) for Virtual Visit.    Method of contact with patient concerns: [facility]    Disclaimer:  This note is prepared using voice recognition software and as such is likely to have errors and has not been proof read. Please contact me for questions.     Time spent: 45 minutes in face to face discussion concerning diagnosis, prognosis, review of lab and test results, benefits of treatment as well as management of disease, counseling of patient and coordination of care between various health care providers.  Greater than half the time spent was used for coordination of care and counseling of patient.    Susana Reyez M.D.  Rheumatology  [facility]

## 2024-01-10 ENCOUNTER — OFFICE VISIT (OUTPATIENT)
Dept: OBSTETRICS AND GYNECOLOGY | Facility: CLINIC | Age: 24
End: 2024-01-10
Payer: COMMERCIAL

## 2024-01-10 VITALS
WEIGHT: 121.94 LBS | BODY MASS INDEX: 23.02 KG/M2 | DIASTOLIC BLOOD PRESSURE: 67 MMHG | HEIGHT: 61 IN | SYSTOLIC BLOOD PRESSURE: 115 MMHG

## 2024-01-10 DIAGNOSIS — R30.0 DYSURIA: Primary | ICD-10-CM

## 2024-01-10 DIAGNOSIS — N89.8 VAGINAL DISCHARGE: ICD-10-CM

## 2024-01-10 LAB
BILIRUB SERPL-MCNC: NORMAL MG/DL
BLOOD URINE, POC: NORMAL
CLARITY, POC UA: NORMAL
COLOR, POC UA: NORMAL
GLUCOSE UR QL STRIP: NORMAL
KETONES UR QL STRIP: NORMAL
LEUKOCYTE ESTERASE URINE, POC: NORMAL
NITRITE, POC UA: NORMAL
PH, POC UA: 5
PROTEIN, POC: NORMAL
SPECIFIC GRAVITY, POC UA: 1.02
UROBILINOGEN, POC UA: NORMAL

## 2024-01-10 PROCEDURE — 1159F MED LIST DOCD IN RCRD: CPT | Mod: CPTII,S$GLB,, | Performed by: OBSTETRICS & GYNECOLOGY

## 2024-01-10 PROCEDURE — 81514 NFCT DS BV&VAGINITIS DNA ALG: CPT | Performed by: OBSTETRICS & GYNECOLOGY

## 2024-01-10 PROCEDURE — 3074F SYST BP LT 130 MM HG: CPT | Mod: CPTII,S$GLB,, | Performed by: OBSTETRICS & GYNECOLOGY

## 2024-01-10 PROCEDURE — 99203 OFFICE O/P NEW LOW 30 MIN: CPT | Mod: S$GLB,,, | Performed by: OBSTETRICS & GYNECOLOGY

## 2024-01-10 PROCEDURE — 3078F DIAST BP <80 MM HG: CPT | Mod: CPTII,S$GLB,, | Performed by: OBSTETRICS & GYNECOLOGY

## 2024-01-10 PROCEDURE — 87086 URINE CULTURE/COLONY COUNT: CPT | Performed by: OBSTETRICS & GYNECOLOGY

## 2024-01-10 PROCEDURE — 3008F BODY MASS INDEX DOCD: CPT | Mod: CPTII,S$GLB,, | Performed by: OBSTETRICS & GYNECOLOGY

## 2024-01-10 PROCEDURE — 99999 PR PBB SHADOW E&M-EST. PATIENT-LVL III: CPT | Mod: PBBFAC,,, | Performed by: OBSTETRICS & GYNECOLOGY

## 2024-01-10 PROCEDURE — 87491 CHLMYD TRACH DNA AMP PROBE: CPT | Performed by: OBSTETRICS & GYNECOLOGY

## 2024-01-10 PROCEDURE — 81002 URINALYSIS NONAUTO W/O SCOPE: CPT | Mod: S$GLB,,, | Performed by: OBSTETRICS & GYNECOLOGY

## 2024-01-10 NOTE — PROGRESS NOTES
"  Subjective:      Patient ID: Kandis Sosa is a 23 y.o. female.    Chief Complaint:  abnormal discharge       History of Present Illness  HPI  22yo presents with c/o vaginal discharge and irritation since taking antibiotics about a month ago.  Has tried probiotics with minimal relief.  Not itchy, mostly irritation.  + sour odor.  Also reports pressure on bladder/dysuria.  No other complaints today.  Sexually active, wears condoms.  IUD placed 2-3 years ago.  Still has monthly cycle, lasts 3-4 days.  Last pap Sept 2023, normal.      GYN & OB History  Patient's last menstrual period was 01/03/2024 (exact date).   Date of Last Pap: 8/24/2023    OB History   No obstetric history on file.       Review of Systems  Review of Systems   Constitutional:  Negative for chills and fever.   Respiratory:  Negative for cough and shortness of breath.    Cardiovascular:  Negative for chest pain.   Gastrointestinal:  Negative for abdominal pain, constipation and diarrhea.   Genitourinary:  Positive for dysuria and vaginal discharge.   Musculoskeletal:  Negative for myalgias.          Objective:     Physical Exam    /67   Ht 5' 1" (1.549 m)   Wt 55.3 kg (121 lb 14.6 oz)   LMP 01/03/2024 (Exact Date) Comment: iud  BMI 23.04 kg/m²     Gen: NAD  Resp: Normal respiratory effort  Abd: soft, NT  Pelvic: Normal-appearing external female genitalia. IUD strings visible at os.  Small amount of thin white discharge noted.  No odor noted today.  No CMT.  Uterus small, mobile, nontender.  No adnexal masses or tenderness.    Ext: normal ROM  Psych: appropriate affect  Neuro: grossly intact    Assessment:     1. Dysuria    2. Vaginal discharge               Plan:     GC/CT, and affirm pending.  Pt declined additional STD blood work.  Urine cx also pending.   Discussed symptoms and findings - offered rx for flagyl while awaiting cultures, patient desires to wait for cultures to return before starting medication.  Discussed " lifestyle changes - no douching, recommend mild, unscented soaps, etc  Counseling done, all questions answered.  Will message patient once cultures result.  RTC Sept for annual, or sooner if symptoms worsen.

## 2024-01-11 LAB
BACTERIA UR CULT: NORMAL
BACTERIA UR CULT: NORMAL
BACTERIAL VAGINOSIS DNA: NEGATIVE
C TRACH DNA SPEC QL NAA+PROBE: NOT DETECTED
CANDIDA GLABRATA DNA: NEGATIVE
CANDIDA KRUSEI DNA: NEGATIVE
CANDIDA RRNA VAG QL PROBE: NEGATIVE
N GONORRHOEA DNA SPEC QL NAA+PROBE: NOT DETECTED
T VAGINALIS RRNA GENITAL QL PROBE: NEGATIVE

## 2024-01-16 ENCOUNTER — PATIENT MESSAGE (OUTPATIENT)
Dept: OBSTETRICS AND GYNECOLOGY | Facility: CLINIC | Age: 24
End: 2024-01-16
Payer: COMMERCIAL

## 2024-01-16 DIAGNOSIS — R30.0 DYSURIA: Primary | ICD-10-CM

## 2024-01-30 ENCOUNTER — PATIENT MESSAGE (OUTPATIENT)
Dept: RHEUMATOLOGY | Facility: CLINIC | Age: 24
End: 2024-01-30

## 2024-01-30 ENCOUNTER — OFFICE VISIT (OUTPATIENT)
Dept: RHEUMATOLOGY | Facility: CLINIC | Age: 24
End: 2024-01-30
Payer: COMMERCIAL

## 2024-01-30 DIAGNOSIS — K52.9 CHRONIC DIARRHEA: ICD-10-CM

## 2024-01-30 DIAGNOSIS — H15.102 EPISCLERITIS OF LEFT EYE: Primary | ICD-10-CM

## 2024-01-30 DIAGNOSIS — R76.8 ANA POSITIVE: ICD-10-CM

## 2024-01-30 PROCEDURE — 99213 OFFICE O/P EST LOW 20 MIN: CPT | Mod: 95,,, | Performed by: STUDENT IN AN ORGANIZED HEALTH CARE EDUCATION/TRAINING PROGRAM

## 2024-01-30 NOTE — PROGRESS NOTES
RHEUMATOLOGY OUTPATIENT CLINIC NOTE    1/30/2024    Attending Rheumatologist: Susana Reyez  Primary Care Provider: Ramon Meyers III, MD   Physician Requesting Consultation: No referring provider defined for this encounter.  Chief Complaint/Reason For Consultation:  No chief complaint on file.      The patient location is:  [place]  The chief complaint leading to consultation is:  Episcleritis  Visit type: Virtual visit with synchronous audio and video  Total time spent with patient: 15 min     Each patient to whom he or she provides medical services by telemedicine is:  (1) informed of the relationship between the physician and patient and the respective role of any other health care provider with respect to management of the patient; and (2) notified that he or she may decline to receive medical services by telemedicine and may withdraw from such care at any time.     Subjective:       HPI  Kandis Sosa is a 23 y.o. White female with no past medical history who comes for evaluation of positive JULITA.  Comes with mother Gloria    Interim history   -Has not have a flare since a month ago that required steroid drops. Did not see Ophthalmology/Optometry at the time.  -Labs showed mildly elevated IgG4 and mildly low vitamin D otherwise unremarkable CBC, CMP, normal ferritin, normal ESR, B12, folate, TSH, T4, ACE, HIV, CRP  -Has appointment scheduled with GI in [facility] to work up chronic diarrhea  -otherwise no concerns    Answers submitted by the patient for this visit:  Rheumatology Questionnaire (Submitted on 1/30/2024)  fever: No  eye redness: No  mouth sores: No  headaches: No  shortness of breath: No  chest pain: No  trouble swallowing: No  diarrhea: No  constipation: No  unexpected weight change: No  genital sore: No  dysuria: No  During the last 3 days, have you had a skin rash?: No  Bruises or bleeds easily: No  cough: No      Disease history    In November 2022, she  started having left pink eye, pain with eye movements, light sensitivity, at least once a month.   She was then evaluated by optometry in May 2023, diagnosed with left eye episcleritis.  Started on prednisolone drops.  Last episode of episcleritis was 3 months ago.  She has prednisolone drop at home and has uses 3 times for the past 3 months only.    Had workup for systemic causes which showed JULITA 1:160 homogeneous with negative sub serologies, negative RF, ESR normal, RPR and FTA negative, ANCA negative.  She was referred to Rheumatology for positive JULITA.    She does endorse other symptoms such as constant fatigue, chronic diarrhea with 3-4 watery episodes daily, poor appetite, diffuse pain primarily in her low back and thighs, frontal headaches that radiates to the temple, recurrent sinus infections.    She denies any skin rashes, oral ulcers, Raynaud's, chest pain, shortness of breath, alopecia    She used to smoke can be but stopped.  Drinks alcohol socially periods does not use any drugs.    She denies any family history of autoimmune conditions that she is aware of.    Chronic comorbid conditions affecting medical decision making today:  Past Medical History:   Diagnosis Date    Attention deficit hyperactivity disorder (ADHD) 2020    Depression 2020    GERD (gastroesophageal reflux disease)     Stomach discomfort     gets stomach aches alot because she cnt digest her food. worked up by Dr. Velásquez 3 years ago--diagnosed with slow transition she has not follewd up since doctor .     Past Surgical History:   Procedure Laterality Date    ESOPHAGOGASTRODUODENOSCOPY      ESOPHAGOGASTRODUODENOSCOPY N/A 2020    Procedure: EGD (ESOPHAGOGASTRODUODENOSCOPY);  Surgeon: Veto Glover MD;  Location: [facility];  Service: Endoscopy;  Laterality: N/A;    LAPAROSCOPIC CHOLECYSTECTOMY N/A 2021    Procedure: CHOLECYSTECTOMY, LAPAROSCOPIC;  Surgeon: René Pérez III, MD;  Location: [facility] OR;   Service: General;  Laterality: N/A;    MANDIBLE SURGERY      TEAR DUCT SURGERY       Family History   Problem Relation Age of Onset    ADD / ADHD Father         never diagnosed    Anxiety disorder Father         never diagnosed    Cancer Father         Neuroendocrine    Suicide Cousin         father's brothers son, he shot himself after his father  he had a drug addiction    Breast cancer Maternal Grandmother     Kidney cancer Maternal Grandmother      Social History     Substance and Sexual Activity   Alcohol Use No     Social History     Tobacco Use   Smoking Status Former    Types: Vaping with nicotine    Start date:     Quit date: 2023    Years since quittin.6   Smokeless Tobacco Never   Tobacco Comments    OCCASIONAL      Social History     Substance and Sexual Activity   Drug Use No       Current Outpatient Medications:     MIRVASO 0.33 % GlwP, Apply topically 2 (two) times daily., Disp: , Rfl:     RHOFADE 1 % Crea, Apply topically., Disp: , Rfl:     WINLEVI 1 % Crea, 1 application  2 (two) times daily. Apply to face, Disp: , Rfl:      Objective:         There were no vitals filed for this visit.    Physical Exam  No rashes on face,   No eye redness    virtual visit    Reviewed old and all outside pertinent medical records available.    All lab results personally reviewed and interpreted by me.  Lab Results   Component Value Date    WBC 4.80 2023    HGB 14.4 2023    HCT 43.2 2023    MCV 99 (H) 2023    MCH 33.0 (H) 2023    MCHC 33.3 2023    RDW 12.0 2023     2023    MPV 9.3 2023    NEUTROABS 1,693 2021       Lab Results   Component Value Date     2023    K 4.0 2023     2023    CO2 20 (L) 2023    GLU 70 2023    BUN 5 (L) 2023    CALCIUM 9.4 2023    PROT 7.2 2023    ALBUMIN 4.2 2023    BILITOT 0.6 2023    AST 17 2023    ALKPHOS 49 (L) 2023     "ALT 11 12/06/2023       Lab Results   Component Value Date    COLORU Dark Yellow 01/10/2024    SPECGRAV 1.020 01/10/2024    PHUR 5 01/10/2024    KETONESU neg 01/10/2024    NITRITE neg 01/10/2024    UROBILINOGEN neg 01/10/2024       Lab Results   Component Value Date    CRP 0.3 12/06/2023       Lab Results   Component Value Date    RF <13.0 07/27/2023    SEDRATE 2 12/06/2023    CCPANTIBODIE <0.5 12/06/2023       No components found for: "25OHVITDTOT", "22FSIAYC7", "36CBFERG1", "METHODNOTE"    Lab Results   Component Value Date    URICACID 5.0 07/27/2023       Lab Results   Component Value Date    HEPCAB NON-REACTIVE 11/11/2021       Imaging:  All imaging reviewed and independently interpreted by me.     ASSESSMENT / PLAN:     Kandis Sosa is a 23 y.o. White female with no past medical history who comes for evaluation of positive JULITA, checked in the setting of recurrent left eye episcleritis    1. JULITA positive  -JULITA 1:160 homogeneous with negative sub serologies  -no specific symptoms for lupus, Sjogren's, scleroderma.    2. Episcleritis of left eye, still having recurrences  -recurrent episodes of nodular episcleritis in the left eye since November 2022.  Has require steroid drops.  Last flare in December 2023.  -workup so far shows negative RPR, FTA antibodies, ANCA, RF. Negative  RF, CCP, ESR, CRP, immunoglobulins. Mildly elevated IGG4 which is non specific.   -discussed with patient that episcleritis can be associated with systemic conditions however the majority of the cases of episcleritis are idiopathic, with some requiring immunosuppression.  Advised her to follow-up with ophthalmology/optometry to decide if she requires any immunosuppression.  She will let me know     3. Chronic diarrhea  -she has an appointment with GI in late February.  -may need colonoscopy/EGD rule out IBD as it can be associated with episcleritis.  -normal B12, iron panel, T4, TSH. Mildly low vitamin D     Follow up in about " 3 months (around 4/30/2024) for Virtual Visit.    Method of contact with patient concerns: [facility]    Disclaimer:  This note is prepared using voice recognition software and as such is likely to have errors and has not been proof read. Please contact me for questions.     Time spent: 15 minutes in face to face discussion concerning diagnosis, prognosis, review of lab and test results, benefits of treatment as well as management of disease, counseling of patient and coordination of care between various health care providers.  Greater than half the time spent was used for coordination of care and counseling of patient.    Susana Reyez M.D.  Rheumatology  [facility]

## 2024-02-19 ENCOUNTER — TELEPHONE (OUTPATIENT)
Dept: OPHTHALMOLOGY | Facility: CLINIC | Age: 24
End: 2024-02-19
Payer: COMMERCIAL

## 2024-04-10 ENCOUNTER — OFFICE VISIT (OUTPATIENT)
Dept: GASTROENTEROLOGY | Facility: CLINIC | Age: 24
End: 2024-04-10
Payer: COMMERCIAL

## 2024-04-10 VITALS
WEIGHT: 119.81 LBS | SYSTOLIC BLOOD PRESSURE: 107 MMHG | DIASTOLIC BLOOD PRESSURE: 73 MMHG | HEART RATE: 73 BPM | BODY MASS INDEX: 22.62 KG/M2 | HEIGHT: 61 IN | OXYGEN SATURATION: 100 %

## 2024-04-10 DIAGNOSIS — K52.9 CHRONIC DIARRHEA: Primary | ICD-10-CM

## 2024-04-10 DIAGNOSIS — E73.9 ADULT LACTASE DEFICIENCY: ICD-10-CM

## 2024-04-10 DIAGNOSIS — H15.102 EPISCLERITIS OF LEFT EYE: ICD-10-CM

## 2024-04-10 DIAGNOSIS — Z90.49 S/P CHOLECYSTECTOMY: ICD-10-CM

## 2024-04-10 PROCEDURE — 3008F BODY MASS INDEX DOCD: CPT | Mod: CPTII,S$GLB,, | Performed by: INTERNAL MEDICINE

## 2024-04-10 PROCEDURE — 1159F MED LIST DOCD IN RCRD: CPT | Mod: CPTII,S$GLB,, | Performed by: INTERNAL MEDICINE

## 2024-04-10 PROCEDURE — 99999 PR PBB SHADOW E&M-EST. PATIENT-LVL III: CPT | Mod: PBBFAC,,, | Performed by: INTERNAL MEDICINE

## 2024-04-10 PROCEDURE — 3078F DIAST BP <80 MM HG: CPT | Mod: CPTII,S$GLB,, | Performed by: INTERNAL MEDICINE

## 2024-04-10 PROCEDURE — 99204 OFFICE O/P NEW MOD 45 MIN: CPT | Mod: S$GLB,,, | Performed by: INTERNAL MEDICINE

## 2024-04-10 PROCEDURE — 3074F SYST BP LT 130 MM HG: CPT | Mod: CPTII,S$GLB,, | Performed by: INTERNAL MEDICINE

## 2024-04-10 RX ORDER — TRETINOIN 0.2 MG/G
CREAM TOPICAL NIGHTLY
COMMUNITY
Start: 2024-03-27

## 2024-04-10 RX ORDER — SODIUM, POTASSIUM,MAG SULFATES 17.5-3.13G
1 SOLUTION, RECONSTITUTED, ORAL ORAL DAILY
Qty: 1 KIT | Refills: 0 | Status: SHIPPED | OUTPATIENT
Start: 2024-04-10 | End: 2024-04-12

## 2024-04-10 RX ORDER — PREDNISOLONE ACETATE 10 MG/ML
SUSPENSION/ DROPS OPHTHALMIC
COMMUNITY
Start: 2024-03-06

## 2024-04-10 RX ORDER — CHOLESTYRAMINE 4 G/9G
4 POWDER, FOR SUSPENSION ORAL 2 TIMES DAILY PRN
Qty: 60 PACKET | Refills: 3 | Status: SHIPPED | OUTPATIENT
Start: 2024-04-10 | End: 2025-04-10

## 2024-04-10 NOTE — PROGRESS NOTES
GASTROENTEROLOGY CLINIC NOTE    Reason for visit: The primary encounter diagnosis was Chronic diarrhea. Diagnoses of Episcleritis of left eye, S/P cholecystectomy, and Adult lactase deficiency were also pertinent to this visit.  Referring provider/PCP: Ramon Meyers III, MD    HPI:  Kandis Sosa is a 23 y.o. female here today for diarrhea, fecal incontinence new patient.  Hx episcleritis, following with rheum.    Prior workup included u/s showing GB sludge, and hida was normal. Had chayo because chronic nausea and had some constipation with fluctuation in bowel habits, assoc gas and pains.   Then had chayo (2021), and those symptoms got better, mainly the nausea and constipation improved.   Now having diarrhea, ongoing not progressive. Started after cholecystectomy. Has noted some foods that make it worse, including oily and greasy and fried, (olive oil is safer). Dairy makes it worse. Worse with meat. Normal is perhaps 3-6  BM / day. Mostly liquid. No blood. Sometimes mucus.   + fecal incontinence in the past. Very rare now.  She mentions her dad takes questran powder because he has diarrhea after his cholecystostomy.     Currently taking doxycycline for acne which has seemed to help the diarrhea.    + chronic fatigue. + diffuse body pain and aches.  No joint issues. No wt changes.    Prior workup  2020  CT normal  Hida normal  U/s - sludge  Nm emptying - normal at 10%      Prior Endoscopy:  EGD:  2020 dibuono  Normal  Negative HP    Colon:  No    (Portions of this note were dictated using voice recognition software and may contain dictation related errors in spelling/grammar/syntax not found on text review)    Review of Systems   Constitutional:  Negative for fever and malaise/fatigue.   Respiratory:  Negative for cough and shortness of breath.    Cardiovascular:  Negative for chest pain and palpitations.   Gastrointestinal:  Positive for diarrhea. Negative for abdominal pain, blood in stool,  "nausea and vomiting.   Neurological:  Negative for dizziness and headaches.       Past Medical History: has a past medical history of Attention deficit hyperactivity disorder (ADHD), Depression, GERD (gastroesophageal reflux disease), and Stomach discomfort.    Past Surgical History: has a past surgical history that includes Esophagogastroduodenoscopy; Tear duct surgery; Mandible surgery; Esophagogastroduodenoscopy (N/A, 7/1/2020); and Laparoscopic cholecystectomy (N/A, 8/2/2021).    Family History:family history includes ADD / ADHD in her father; Anxiety disorder in her father; Breast cancer in her maternal grandmother; Cancer in her father; Kidney cancer in her maternal grandmother; Suicide in her cousin.    Allergies: Review of patient's allergies indicates:  No Known Allergies    Social History: reports that she has been smoking vaping with nicotine. She started smoking about 4 years ago. She has never used smokeless tobacco. She reports that she does not drink alcohol and does not use drugs.    Home medications:   Current Outpatient Medications on File Prior to Visit   Medication Sig Dispense Refill    doxycycline (VIBRAMYCIN) 50 mg/5 mL Syrp Take 50 mg by mouth.      MIRVASO 0.33 % GlwP Apply topically 2 (two) times daily.      prednisoLONE acetate (PRED FORTE) 1 % DrpS Place into the left eye.      RENOVA 0.02 % Crea Apply topically every evening.      [DISCONTINUED] RHOFADE 1 % Crea Apply topically.      [DISCONTINUED] WINLEVI 1 % Crea 1 application  2 (two) times daily. Apply to face       No current facility-administered medications on file prior to visit.       Vital signs:  /73 (BP Location: Left arm, Patient Position: Sitting, BP Method: Small (Automatic))   Pulse 73   Ht 5' 1" (1.549 m)   Wt 54.3 kg (119 lb 13.1 oz)   SpO2 100%   BMI 22.64 kg/m²     Physical Exam  Vitals reviewed.   Constitutional:       General: She is not in acute distress.  HENT:      Head: Normocephalic and atraumatic. "   Eyes:      General: No scleral icterus.     Conjunctiva/sclera: Conjunctivae normal.   Abdominal:      General: There is no distension.      Palpations: Abdomen is soft.      Tenderness: There is no abdominal tenderness.   Skin:     General: Skin is warm.      Coloration: Skin is not pale.      Findings: No rash.   Neurological:      Mental Status: She is oriented to person, place, and time.      Gait: Gait normal.   Psychiatric:         Mood and Affect: Mood normal.         Behavior: Behavior normal.         I have reviewed prior labs, imaging, notes from last month    Assessment:  1. Chronic diarrhea    2. Episcleritis of left eye    3. S/P cholecystectomy    4. Adult lactase deficiency      Ongoing diarrhea, seems most consistent with bile acid diarrhea after her chayo.  However, given multiple ongoing symptoms including hx of episcleritis, I think colonoscopy warranted to eval for crohn disease.    Plan:  Orders Placed This Encounter    Clostridium difficile EIA    Stool culture    Tissue Transglutaminase, IgA    IgA    C-Reactive Protein    Giardia / Cryptosporidum, EIA    H. pylori antigen, stool    Calprotectin, Stool    Stool Exam-Ova,Cysts,Parasites    WBC, Stool    Pancreatic elastase, fecal    Fecal fat, qualitative    CBC Auto Differential    TSH    Comprehensive Metabolic Panel    sodium,potassium,mag sulfates (SUPREP BOWEL PREP KIT) 17.5-3.13-1.6 gram SolR    cholestyramine (QUESTRAN) 4 gram packet    Case Request Endoscopy: COLONOSCOPY       Blood and stool    Colonoscopy with biopsies , rule out crohn.    Start questran powder.      RTC based on above.    Marcio Magaña MD  [facility] [place]

## 2024-06-10 ENCOUNTER — OFFICE VISIT (OUTPATIENT)
Dept: OPTOMETRY | Facility: CLINIC | Age: 24
End: 2024-06-10
Payer: COMMERCIAL

## 2024-06-10 DIAGNOSIS — Z13.5 SCREENING FOR EYE CONDITION: ICD-10-CM

## 2024-06-10 DIAGNOSIS — H11.433 CONJUNCTIVAL INJECTION, BILATERAL: Primary | ICD-10-CM

## 2024-06-10 PROCEDURE — 99999 PR PBB SHADOW E&M-EST. PATIENT-LVL III: CPT | Mod: PBBFAC,,, | Performed by: OPTOMETRIST

## 2024-06-10 PROCEDURE — 92012 INTRM OPH EXAM EST PATIENT: CPT | Mod: S$GLB,,, | Performed by: OPTOMETRIST

## 2024-06-10 PROCEDURE — 1159F MED LIST DOCD IN RCRD: CPT | Mod: CPTII,S$GLB,, | Performed by: OPTOMETRIST

## 2024-06-10 NOTE — PATIENT INSTRUCTIONS
Ms. Sosa presents today with trace injection of bulbar conjunctiva of the right eye and focal 1-2+ injection of the juxtalimbal bulbar conjunctiva temporally in the left eye.   No ocular pain/discomfort.  No tearing.  No discharge.    No evidence of episcleritis.  Suggest trial with Lumify for conjunctival injection once per day (or twice per day, max), as needed.  Suggest use of artificial tears (preferably non-preserved) for use throughout the day as needed.    Return if Ms. Sosa notes any eye pain associated with conjunctival injection

## 2024-06-11 NOTE — PROGRESS NOTES
HPI     Eye Problem            Comments: Ms. Sosa presents with focal 1-2+ injection of juxtalimbal   bulbar conjunctiva of the left eye.  No ocular pain/discomfort.  No   tearing.  No discharge.          Comments    Date last seen:  08/21/2023    Doctor last seen:  Dr. Reyes    Prescribed eye medications(s) using:  none currenlty    OTC eye medication(s) using:  No    Signs/symptoms of condition resolved/better/stable/worse?:  presents today   with focal 1-2+ injection of juxtalimbal bulbar conjunctiva of the left   eye.  No pain/discomfort.  No tearing.  No discharge.  Has been seen in rheumatology.  Had work-up done.  Negative.                       Last edited by Pedro Luis Reyes OD on 6/10/2024  8:22 PM.            Assessment /Plan     For exam results, see Encounter Report.    1. Conjunctival injection, bilateral        2. Screening for eye condition                     Ms. Sosa presents today with trace injection of bulbar conjunctiva of the right eye and focal 1-2+ injection of the juxtalimbal bulbar conjunctiva temporally in the left eye.   No ocular pain/discomfort.  No tearing.  No discharge.    No evidence of episcleritis.  Suggest trial with Lumify for conjunctival injection once per day (or twice per day, max), as needed.  Suggest use of artificial tears (preferably non-preserved) for use throughout the day as needed.    Return if Ms. Sosa notes any eye pain associated with conjunctival injection            Chorioamnionitis/Other

## 2024-07-01 ENCOUNTER — OFFICE VISIT (OUTPATIENT)
Dept: OBSTETRICS AND GYNECOLOGY | Facility: CLINIC | Age: 24
End: 2024-07-01
Payer: COMMERCIAL

## 2024-07-01 VITALS
HEIGHT: 61 IN | DIASTOLIC BLOOD PRESSURE: 69 MMHG | WEIGHT: 119.69 LBS | SYSTOLIC BLOOD PRESSURE: 110 MMHG | BODY MASS INDEX: 22.6 KG/M2

## 2024-07-01 DIAGNOSIS — N76.0 ACUTE VULVOVAGINITIS: ICD-10-CM

## 2024-07-01 DIAGNOSIS — N94.10 DYSPAREUNIA, FEMALE: Primary | ICD-10-CM

## 2024-07-01 DIAGNOSIS — Z11.3 SCREEN FOR STD (SEXUALLY TRANSMITTED DISEASE): ICD-10-CM

## 2024-07-01 PROCEDURE — 99999 PR PBB SHADOW E&M-EST. PATIENT-LVL IV: CPT | Mod: PBBFAC,,, | Performed by: STUDENT IN AN ORGANIZED HEALTH CARE EDUCATION/TRAINING PROGRAM

## 2024-07-01 PROCEDURE — 87591 N.GONORRHOEAE DNA AMP PROB: CPT | Performed by: STUDENT IN AN ORGANIZED HEALTH CARE EDUCATION/TRAINING PROGRAM

## 2024-07-01 PROCEDURE — 1159F MED LIST DOCD IN RCRD: CPT | Mod: CPTII,S$GLB,, | Performed by: STUDENT IN AN ORGANIZED HEALTH CARE EDUCATION/TRAINING PROGRAM

## 2024-07-01 PROCEDURE — 99213 OFFICE O/P EST LOW 20 MIN: CPT | Mod: S$GLB,,, | Performed by: STUDENT IN AN ORGANIZED HEALTH CARE EDUCATION/TRAINING PROGRAM

## 2024-07-01 PROCEDURE — 3008F BODY MASS INDEX DOCD: CPT | Mod: CPTII,S$GLB,, | Performed by: STUDENT IN AN ORGANIZED HEALTH CARE EDUCATION/TRAINING PROGRAM

## 2024-07-01 PROCEDURE — 1160F RVW MEDS BY RX/DR IN RCRD: CPT | Mod: CPTII,S$GLB,, | Performed by: STUDENT IN AN ORGANIZED HEALTH CARE EDUCATION/TRAINING PROGRAM

## 2024-07-01 PROCEDURE — 3074F SYST BP LT 130 MM HG: CPT | Mod: CPTII,S$GLB,, | Performed by: STUDENT IN AN ORGANIZED HEALTH CARE EDUCATION/TRAINING PROGRAM

## 2024-07-01 PROCEDURE — 81514 NFCT DS BV&VAGINITIS DNA ALG: CPT | Performed by: STUDENT IN AN ORGANIZED HEALTH CARE EDUCATION/TRAINING PROGRAM

## 2024-07-01 PROCEDURE — 87491 CHLMYD TRACH DNA AMP PROBE: CPT | Performed by: STUDENT IN AN ORGANIZED HEALTH CARE EDUCATION/TRAINING PROGRAM

## 2024-07-01 PROCEDURE — 3078F DIAST BP <80 MM HG: CPT | Mod: CPTII,S$GLB,, | Performed by: STUDENT IN AN ORGANIZED HEALTH CARE EDUCATION/TRAINING PROGRAM

## 2024-07-01 RX ORDER — FLUCONAZOLE 150 MG/1
150 TABLET ORAL DAILY
Qty: 1 TABLET | Refills: 1 | Status: SHIPPED | OUTPATIENT
Start: 2024-07-01 | End: 2024-07-03

## 2024-07-01 NOTE — PROGRESS NOTES
Subjective     Patient ID: Kandis Sosa is a 23 y.o. female.    Chief Complaint:  Dyspareunia (Pain during and after sex for about a month now, it is getting worse.), Cramping, and Vaginitis (Itching and irritation)      History of Present Illness  22 yo G0 presents for vaginitis and dyspareunia    Noticed some pain during sex about 1 mo ago, last few times has become more painful afterwards.   Feels like worst period cramps ever, pain is sharp and is felt in middle of stomach area. Ibuprofen has helped somewhat    Uses skyn non-latex condoms, does not use lubricant. Pain is not with initiation insertion but after starting  SA, 3 partners in the last 6 months. Uses condoms always and has IUD for BC. Current partner does not have any genital piercings    Reporting odor and vaginal itching for the past 2 days, feels like previous yeast infections.    Worked up by GI for possible UC, recent negative colonoscopy. History of diarrhea/fecal incontinence since cholecystectomy    GYN & OB History  Patient's last menstrual period was 06/15/2024 (approximate).   Date of Last Pap: 2023    OB History    Para Term  AB Living   0 0 0 0 0 0   SAB IAB Ectopic Multiple Live Births   0 0 0 0 0       Review of Systems  Review of Systems   Genitourinary:  Positive for pelvic pain.   All other systems reviewed and are negative.         Objective   Physical Exam:   Constitutional: She appears well-developed and well-nourished.    HENT:   Head: Normocephalic and atraumatic.    Eyes: EOM are normal.      Pulmonary/Chest: Effort normal.        Abdominal: Soft.     Genitourinary:    Uterus, right adnexa and left adnexa normal.      Pelvic exam was performed with patient in the lithotomy position.   The external female genitalia was normal.   Genitalia hair distrobution normal .   There is no lesion on the right labia. There is no lesion on the left labia. Cervix is normal. There is vaginal discharge (thick,  white) and tenderness (point tenderness on L side during bimanual) in the vagina.    pap smear completed   Genitourinary Comments: Female chaperone present for duration of exam   IUD strings visualized.          Musculoskeletal: Normal range of motion.       Neurological: She is alert.    Skin: Skin is warm and dry.    Psychiatric: She has a normal mood and affect.            Assessment and Plan     1. Dyspareunia, female    2. Acute vulvovaginitis    3. Screen for STD (sexually transmitted disease)        Plan:  1. Dyspareunia, female  - US Pelvis Comp with Transvag NON-OB (xpd; Future  - Ambulatory referral/consult to Physical/Occupational Therapy; Future    2. Acute vulvovaginitis  - C. trachomatis/N. gonorrhoeae by AMP DNA [facility]; Cervix  - fluconazole (DIFLUCAN) 150 MG Tab; Take 1 tablet (150 mg total) by mouth once daily. for 2 days  Dispense: 1 tablet; Refill: 1    3. Screen for STD (sexually transmitted disease)  - Vaginosis Screen by DNA Probe      Follow up within 6 weeks after US and PFPT ilya Marsh III, MD  [facility] [place] [facility]   [address]  [place] [id number] [phone]

## 2024-07-01 NOTE — PATIENT INSTRUCTIONS
Self-Help Tips for Vulvar Skin Care  Prevention of recurrent vulvar and vaginal irritation often begins with the use of hypoallergenic products, plus avoidance of exposures that irritate sensitive skin.     Clothing and Laundry  Wear all cotton underwear.   Do not wear pantyhose (wear thigh high or knee high hose instead).   Wear loose-fitting bottoms  Remove wet bathing suits and exercise clothing promptly.   Use hypoallergenic detergent such as Tide Free, or All Free and Clear.  Do not use fabric softener on undergarments.    Hygiene  Use soft, white, unscented toilet paper.   Use lukewarm or cool sitz baths to relieve burning and irritation.   Avoid getting shampoo on the vulvar area.   Do not douche, use bubble bath, feminine hygiene products, or any perfumed creams or soaps.   Wash the vulva with cool to lukewarm water only.  Rinse the vulva with water after urination.  Urinate before the bladder is full.   Prevent constipation by adding fiber to your diet (if necessary, use a psyllium product such as Metamucil) and drinking at least 8 glasses of water daily.  Use unscented menstrual pads and tampons. Do not wear panty liners daily.    Vaginal intercourse  Use a lubricant that is hypoallergenic and unscented. Silicon-based lubricant might be less irritating than lubricants that are water based and contain glycerin.  Talk with your provider about a prescription for a topical anesthestic, e.g., Lidocaine gel 5%. (This may sting for the first 3-5 minutes after application.)  Urinate after sexual intercourse and rinse vulva with cool water  Do not use contraceptive creams, gels, or spermicides.  Avoid penetrative intercourse until symptoms have gone away    Physical Activities  Avoid exercises that put direct pressure on the vulva such as bicycle riding and horseback riding.  Limit intense exercises that create a lot of friction in the vulvar area (try lower intensity exercises such as walking).  Use a frozen gel  pack wrapped in a towel to relieve symptoms after exercise.   Enroll in an exercise class such as yoga to learn stretching and relaxation exercises.  Don't swim in highly chlorinated pools.   Avoid the use of hot tubs that are treated with high levels of chemical sanitizers.

## 2024-07-02 LAB
BACTERIAL VAGINOSIS DNA: NEGATIVE
C TRACH DNA SPEC QL NAA+PROBE: NOT DETECTED
CANDIDA GLABRATA DNA: NEGATIVE
CANDIDA KRUSEI DNA: NEGATIVE
CANDIDA RRNA VAG QL PROBE: POSITIVE
N GONORRHOEA DNA SPEC QL NAA+PROBE: NOT DETECTED
T VAGINALIS RRNA GENITAL QL PROBE: NEGATIVE

## 2024-07-09 ENCOUNTER — PATIENT MESSAGE (OUTPATIENT)
Dept: ADMINISTRATIVE | Facility: HOSPITAL | Age: 24
End: 2024-07-09
Payer: COMMERCIAL

## 2024-07-11 ENCOUNTER — TELEPHONE (OUTPATIENT)
Dept: OBSTETRICS AND GYNECOLOGY | Facility: CLINIC | Age: 24
End: 2024-07-11
Payer: COMMERCIAL

## 2024-07-11 NOTE — TELEPHONE ENCOUNTER
----- Message from Papo Marsh III, MD sent at 7/10/2024  5:26 PM CDT -----  Let Kandis know the vaginal swab was positive for a yeast infection, the medication sent at time of her appointment should clear this up

## 2024-07-29 ENCOUNTER — CLINICAL SUPPORT (OUTPATIENT)
Dept: REHABILITATION | Facility: OTHER | Age: 24
End: 2024-07-29
Payer: COMMERCIAL

## 2024-07-29 DIAGNOSIS — N94.10 DYSPAREUNIA, FEMALE: ICD-10-CM

## 2024-07-29 PROCEDURE — 97530 THERAPEUTIC ACTIVITIES: CPT | Mod: PN

## 2024-07-29 PROCEDURE — 97161 PT EVAL LOW COMPLEX 20 MIN: CPT | Mod: PN

## 2024-07-29 NOTE — PLAN OF CARE
[facility]   Pelvic Health Physical Therapy Initial Evaluation        Date: 7/29/2024   [place] [facility] [place] [id number]    Therapy Diagnosis:   Encounter Diagnosis   Name Primary?    Dyspareunia, female      Referring Provider: Papo Marsh III, MD    Referring Provider Orders: PT Eval and Treat  Medical Diagnosis from Referral: Dyspareunia, female [N94.10]   Evaluation Date: 7/29/2024  Authorization Period Expiration: 12/31/2024  Plan of Care Expiration: 10/29/2024  Visit # / Visits authorized: 1/pending  FOTO: 1/3 (7/29/2024)    Precautions: standard    Time In: 4:15  Time Out: 5:00 pm  Total Appointment Time (timed & untimed codes): 45 minutes    SUBJECTIVE     Date of onset: 1 month ago   History of current condition: Kandis reports Noticed some pain during sex about 1 mo ago, last few times has become more painful afterwards. Feels like worst period cramps ever, pain is sharp and is felt in middle of stomach area.  She states that this is mostly with deeper penetration. States that she has been taking meds prior to to help address this. She got her gallbladder removed in 2021.        BLADDER HISTORY  Frequency of urination:   Day: 7-10           Night: 0-1   Difficulty initiating urine stream: No  Urine stream: weak and strong  Complete emptying: Yes- sometimes doesn't feel completely empty   Post-void dribbling: No  Urinary Urgency: Yes    BOWEL HISTORY  Frequency of bowel movements: 1-2 times a day  Difficulty initiating BM: No  Quality/Shape of BM: [place] Stool Chart Type 4- Type 7   Incomplete Emptying? No  Fiber Supplements or Laxative Use? No  Colon leakage: Yes  Frequency of incidents: 6 months ago-  she states that she might have been drinking more   Amount leaked (bowels): small amount    SEXUAL/PELVIC PAIN HISTORY  Sexually active? Yes   Pain with vaginal exams, intercourse or tampon use? Yes  Pain with wearing certain clothes, sitting on certain  "surfaces? No  Tailbone Injury? No   Feeling of pelvic heaviness? 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     Surgical History: Kandis Sosa  has a past surgical history that includes Esophagogastroduodenoscopy; Tear duct surgery; Mandible surgery; Esophagogastroduodenoscopy (N/A, 07/01/2020); Laparoscopic cholecystectomy (N/A, 08/02/2021); Colonoscopy w/ biopsies and polypectomy (05/08/2024); and Colonoscopy (N/A, 5/8/2024).    Medications: Kandis has a current medication list which includes the following prescription(s): cholestyramine, mirvaso, prednisolone acetate, and renova, and the following Facility-Administered Medications: 0.9% nacl.    Allergies: Review of patient's allergies indicates:  No Known Allergies     OBJECTIVE     See EMR under MEDIA for written consent provided 7/29/2024  Chaperone: declined    ORTHO SCREEN  Posture in sitting: slouched   Posture in standing: WNL  Standing load transfer: fair pelvic girdle stability with single-leg balance    Hip Strength 7/29/2024   R hip flexion 4/5   R hip abduction 4/5   R hip external rotation 4/5   L hip flexion 4/5   L hip external rotation 4/5   L hip abduction  4/5     Hip Range of " Motion 7/29/2024   R internal rotation 25% limited   L internal rotation   25% limited        ABDOMINAL WALL ASSESSMENT  Palpation: trigger points  Pelvic Girdle Stability: Pt demonstrates mildly impaired ability to stabilize pelvis with Active Straight Leg Raise Test. Able to improve markedly with verbal/manual cues for transverse abdominus activation.   Diastasis Recti: absent,   Motor Control: able to demonstrate appropriate transverse abdominis contraction on command, with verbal and tactile cues     BREATHING MECHANICS ASSESSMENT   Thorax Assessment During Quiet Respiration: WNL excursion of ribcage and WNL excursion of abdominal wall  Thorax Assessment During Deep Respiration: WNL excursion of ribcage and WNL excursion of abdominal wall    VAGINAL PELVIC FLOOR EXAM - external assessment  Introitus: WNL  Skin condition: WNL  Scarring: none   Sensation: WNL   Voluntary contraction: visible lift  Voluntary relaxation: nil  Bearing down: reflex tightening     VAGINAL PELVIC FLOOR EXAM - internal assessment  Pain: tenderness to palpation of bilateral bulbocavernosus, ischiocavernosus, puborectalis, iliococcygeus, and coccygeus  Sensation: able to localized pressure appropriately   Vaginal vault: WNL   Muscle Bulk: within normal limits tone  Muscle Power: 3/5  Muscle Endurance: 6 seconds  # Reps To Fatigue: 10    Fast Contractions in 10 seconds: 4     Quality of contraction: decreased hold, slow relaxation, and incomplete relaxation   Specificity: Pt tends to hold breath   Coordination: tends to hold breath during PFM contration and cannot isolate PFM from abdominals       Limitation/Restriction for FOTO Pelvic Floor Surveys    Therapist reviewed FOTO scores for Kandis Sosa on 7/29/2024  FOTO documents entered into [employer] - see Media section.    Intake Score:   See FOTO      TREATMENT     Total Treatment time (time-based codes) separate from the evaluation: 10 minutes     Therapeutic activities to improve  functional performance for 10 minutes -  [x] Instruction on diaphragmatic breathing and hip-opening stretches for pelvic floor relaxation      PATIENT EDUCATION AND HOME EXERCISES     Education provided: general anatomy/physiology of urinary & bowel system, benefits of treatment, and alternative methods of treatment were discussed with the patient. Additionally, Kandis was provided education on pt prognosis, PT plan of care, pelvic floor anatomy & function, relationship between TrA & PFM, relationship between hips & PFM, impact of stress/trauma on the pelvic floor, and abdominal wall anatomy     Written Home Exercises provided: yes  Exercises were reviewed, and Kandis was able to demonstrate them prior to the end of the session. Kandis demonstrated good understanding of the education provided. See EMR under 'Patient Instructions' for exercises and education provided during session.    ASSESSMENT     Kandis is a 23 y.o. female referred to outpatient physical therapy with a medical diagnosis of Dyspareunia, female [N94.10] . Pt presents with deficits to pelvic floor muscle strength, endurance, and coordination, as well as hip weakness, functional core weakness, pelvic girdle stability deficits, and myofascial pain. Symptoms appear consistent with pelvic floor dysfunction,. Symptoms impair the patient's ability to perform ADLs and functional mobility, as well as remain continent.    Patient prognosis is good  Kandis will benefit from skilled outpatient physical therapy to address the deficits stated above and in the chart below, provide patient/family education, and to maximize the patient's level of independence.     Plan of care discussed with patient: yes  Patient's spiritual, cultural and educational needs considered, and the patient is agreeable to the plan of care and goals as stated below:     Anticipated Barriers for therapy: none    Medical Necessity is demonstrated by the following -  History  Co-morbidities  and personal factors that may impact the plan of care Co-morbidities   Constipation     Personal Factors  no deficits     low   Examination  Body structures and functions, activity limitations and participation restrictions that may impact the plan of care Body Regions/Systems/Functions:   altered posture, poor trunk stability, poor knowledge of vulvar irritants, and unable to co-contract or co-relax abdominal wall and pelvic floor muscles     Activity Limitations:  pain with full bladder affecting ADL participation and/or sleep and intercourse/vaginal exam/tampon use without pain    Participation Restrictions:  well woman's exam and ADL participation affected by pain    Activity limitations:   Learning and applying knowledge  no deficits    General Tasks and Commands  no deficits    Communication  no deficits    Mobility  lifting and carrying objects    Self care  no deficits    Domestic Life  no deficits    Interactions/Relationships  no deficits    Life Areas  no deficits    Community and Social Life  Community life, recreation & leisure       low   Clinical Presentation stable and uncomplicated low   Decision Making/ Complexity Score: low         Short Term Goals: 6 weeks   Pt to demonstrate proper diaphragmatic breathing to help with calming the nervous system in order to decrease pain and to improve abdominal wall musculature extensibility.   Pt to report being able to complete a half day at work without significant increase in pain to demonstrate a return towards prior level of function.  Pt to demonstrate good body mechanics when performing ADLs such as lifting and bending to decrease strain to lumbopelvic structures and reduce risk of further injury.  Pt to report minimal pain with palpation of abdominal wall due to improvement in soft tissue mobility from moderate to minimal restrictions.  Long Term Goals: 12 weeks   Pt to be discharged with home plan for carry over after discharge.   Pt will be trained  and compliant with postural strategies in sitting and standing to improve alignment and decrease pain and muscle fatigue  Pt to report being able to complete a full day at work without significant increase in pain to demonstrate a return towards prior level of function.  Pt to report improved restorative sleeping, waking up no more than 1x/night from pain.  Pt to report being able to have a comfortable vaginal exam without significant increase in pelvic pain.  Pt to increase abdominal wall strength by at least 1 muscle grade to improve lumbopelvic stability with ADLs that would normally increase pain.    PLAN     Plan of Care certification: 7/29/2024 to 10/29/2024    Outpatient Physical Therapy - 1 time per week for 12 weeks to include the following interventions: Therapeutic Exercise, Manual Therapy, Therapeutic Activity, Neuromuscular Re-education, Electrical Stimulation Unattended, Self-Care, Patient Education Progress HEP towards D/C. Recommend F/U with MD if symptoms worsen or do not resolve. Patient may be seen by a PTA for treatment to carry out their plan of care.  Face-to-face conferences will be held.       Chuy Sousa, PT, DPT            I CERTIFY THE NEED FOR THESE SERVICES FURNISHED UNDER THIS PLAN OF TREATMENT AND WHILE UNDER MY CARE   Physician's comments:     Physician's Signature: ___________________________________________________

## 2024-07-30 ENCOUNTER — OFFICE VISIT (OUTPATIENT)
Dept: OPTOMETRY | Facility: CLINIC | Age: 24
End: 2024-07-30
Payer: COMMERCIAL

## 2024-07-30 DIAGNOSIS — H15.102 EPISCLERITIS OF LEFT EYE: Primary | ICD-10-CM

## 2024-07-30 PROCEDURE — 99213 OFFICE O/P EST LOW 20 MIN: CPT | Mod: S$GLB,,, | Performed by: OPTOMETRIST

## 2024-07-30 PROCEDURE — 1159F MED LIST DOCD IN RCRD: CPT | Mod: CPTII,S$GLB,, | Performed by: OPTOMETRIST

## 2024-07-30 PROCEDURE — 99999 PR PBB SHADOW E&M-EST. PATIENT-LVL III: CPT | Mod: PBBFAC,,, | Performed by: OPTOMETRIST

## 2024-07-30 PROCEDURE — 1160F RVW MEDS BY RX/DR IN RCRD: CPT | Mod: CPTII,S$GLB,, | Performed by: OPTOMETRIST

## 2024-07-30 NOTE — PROGRESS NOTES
HPI    22 Y/o female is here for routine eye exam with C/o pt states has a   reoccurring episcleritis issues, pt say's she just recently had a breakout   and she was not using her drops in the beginning pt say's OS got so bad   that when she did use the lumify eye drops it did not heal her OS, pt   say's OS was red and she had a bump on eyeball nasal corner. Pt OS painful   and then subsided in about a week.   Pt denies pain and discomfort   No f/f    Eye med: lumify OU QD   PREDNISOLONE OS TID   Last edited by Kj King MA on 7/30/2024 10:48 AM.            Assessment /Plan     For exam results, see Encounter Report.    Episcleritis of left eye      See Eric/Saeed notes: hx recurrent episcleritis OS (see rheum notes: JULITA positive).  Pt had episode pop up last week, restarted PRED, and resolved today.    PLAN:    Discussed YSTANE COMPLETE QID OS as preventative  Discussed risks of long term steroid use re. Cat/IOP  Pt will message me if sx return, and needs PRED refill.  Discussed w pt if still gets multiple episodes a year with ATs may switch to LOTEMAX for better safety profile  Rtc for routine

## 2024-07-31 ENCOUNTER — OFFICE VISIT (OUTPATIENT)
Dept: OBSTETRICS AND GYNECOLOGY | Facility: CLINIC | Age: 24
End: 2024-07-31
Payer: COMMERCIAL

## 2024-07-31 ENCOUNTER — LAB VISIT (OUTPATIENT)
Dept: LAB | Facility: HOSPITAL | Age: 24
End: 2024-07-31
Attending: STUDENT IN AN ORGANIZED HEALTH CARE EDUCATION/TRAINING PROGRAM
Payer: COMMERCIAL

## 2024-07-31 VITALS
WEIGHT: 121.38 LBS | HEIGHT: 61 IN | DIASTOLIC BLOOD PRESSURE: 76 MMHG | SYSTOLIC BLOOD PRESSURE: 108 MMHG | BODY MASS INDEX: 22.92 KG/M2

## 2024-07-31 DIAGNOSIS — B00.9 HSV-2 (HERPES SIMPLEX VIRUS 2) INFECTION: Primary | ICD-10-CM

## 2024-07-31 DIAGNOSIS — B00.9 HSV-2 (HERPES SIMPLEX VIRUS 2) INFECTION: ICD-10-CM

## 2024-07-31 PROCEDURE — 1159F MED LIST DOCD IN RCRD: CPT | Mod: CPTII,S$GLB,, | Performed by: STUDENT IN AN ORGANIZED HEALTH CARE EDUCATION/TRAINING PROGRAM

## 2024-07-31 PROCEDURE — 1160F RVW MEDS BY RX/DR IN RCRD: CPT | Mod: CPTII,S$GLB,, | Performed by: STUDENT IN AN ORGANIZED HEALTH CARE EDUCATION/TRAINING PROGRAM

## 2024-07-31 PROCEDURE — 3074F SYST BP LT 130 MM HG: CPT | Mod: CPTII,S$GLB,, | Performed by: STUDENT IN AN ORGANIZED HEALTH CARE EDUCATION/TRAINING PROGRAM

## 2024-07-31 PROCEDURE — 3008F BODY MASS INDEX DOCD: CPT | Mod: CPTII,S$GLB,, | Performed by: STUDENT IN AN ORGANIZED HEALTH CARE EDUCATION/TRAINING PROGRAM

## 2024-07-31 PROCEDURE — 3078F DIAST BP <80 MM HG: CPT | Mod: CPTII,S$GLB,, | Performed by: STUDENT IN AN ORGANIZED HEALTH CARE EDUCATION/TRAINING PROGRAM

## 2024-07-31 PROCEDURE — 99999 PR PBB SHADOW E&M-EST. PATIENT-LVL III: CPT | Mod: PBBFAC,,, | Performed by: STUDENT IN AN ORGANIZED HEALTH CARE EDUCATION/TRAINING PROGRAM

## 2024-07-31 PROCEDURE — 87529 HSV DNA AMP PROBE: CPT | Performed by: STUDENT IN AN ORGANIZED HEALTH CARE EDUCATION/TRAINING PROGRAM

## 2024-07-31 PROCEDURE — 99213 OFFICE O/P EST LOW 20 MIN: CPT | Mod: S$GLB,,, | Performed by: STUDENT IN AN ORGANIZED HEALTH CARE EDUCATION/TRAINING PROGRAM

## 2024-07-31 RX ORDER — VALACYCLOVIR HYDROCHLORIDE 500 MG/1
500 TABLET, FILM COATED ORAL 2 TIMES DAILY
Qty: 60 TABLET | Refills: 11 | Status: CANCELLED | OUTPATIENT
Start: 2024-07-31 | End: 2025-07-31

## 2024-07-31 RX ORDER — VALACYCLOVIR HYDROCHLORIDE 500 MG/1
500 TABLET, FILM COATED ORAL DAILY
Qty: 30 TABLET | Refills: 11 | Status: SHIPPED | OUTPATIENT
Start: 2024-07-31

## 2024-07-31 RX ORDER — VALACYCLOVIR HYDROCHLORIDE 1 G/1
1000 TABLET, FILM COATED ORAL EVERY 12 HOURS
Qty: 20 TABLET | Refills: 0 | Status: SHIPPED | OUTPATIENT
Start: 2024-07-31 | End: 2024-08-10

## 2024-07-31 NOTE — PROGRESS NOTES
Subjective     Patient ID: Kandis Sosa is a 23 y.o. female.    Chief Complaint:  Gynecologic Exam      History of Present Illness  22 yo G0 presents to discuss HSV    Was diagnosed by serology as having HSV 2 by previous provider, never had an outbreak before  For the last few days has noticed painful ulcer on R vulva, would like to start medication for this      GYN & OB History  Patient's last menstrual period was 2024 (approximate).   Date of Last Pap: 2023    OB History    Para Term  AB Living   0 0 0 0 0 0   SAB IAB Ectopic Multiple Live Births   0 0 0 0 0       Review of Systems  Review of Systems   Genitourinary:  Positive for genital sores.   All other systems reviewed and are negative.         Objective   Physical Exam:   Constitutional: She is oriented to person, place, and time. She appears well-developed and well-nourished.    HENT:   Head: Normocephalic and atraumatic.    Eyes: Conjunctivae and EOM are normal.      Pulmonary/Chest: Effort normal.          Genitourinary:          Pelvic exam was performed with patient in the lithotomy position.   Genitalia hair distrobution normal .             Musculoskeletal: Normal range of motion.       Neurological: She is alert and oriented to person, place, and time.    Skin: Skin is warm and dry.    Psychiatric: She has a normal mood and affect.            Assessment and Plan     1. HSV-2 (herpes simplex virus 2) infection       Plan:  1. HSV-2 (herpes simplex virus 2) infection  - Exam c/w HSV-2, swabbed to send for viral PCR. Discussed episodic vs suppressive treatment. Will treat current outbreak and transition to suppressive therapy   - Discussed mode of transmission and recommendation to abstain from intercourse with any prodromal sx and start treatment when those occur     - valACYclovir (VALTREX) 500 MG tablet; Take 1 tablet (500 mg total) by mouth once daily. For suppression  Dispense: 30 tablet; Refill: 11  -  valACYclovir (VALTREX) 1000 MG tablet; Take 1 tablet (1,000 mg total) by mouth every 12 (twelve) hours. for 10 days  Dispense: 20 tablet; Refill: 0  - HSV by Rapid PCR, Non-Blood [facility]; Other (Specify) (Vulva); Future      Papo Marsh III, MD  [facility] - OB GYN   [address]  [place] [address] [phone]

## 2024-08-03 LAB
HSV1 DNA SPEC QL NAA+PROBE: POSITIVE
HSV2 DNA SPEC QL NAA+PROBE: NEGATIVE
SPECIMEN SOURCE: ABNORMAL

## 2024-08-19 ENCOUNTER — CLINICAL SUPPORT (OUTPATIENT)
Dept: REHABILITATION | Facility: OTHER | Age: 24
End: 2024-08-19
Payer: COMMERCIAL

## 2024-08-19 DIAGNOSIS — N94.10 DYSPAREUNIA, FEMALE: Primary | ICD-10-CM

## 2024-08-19 PROCEDURE — 97112 NEUROMUSCULAR REEDUCATION: CPT | Mod: PN

## 2024-08-19 PROCEDURE — 97530 THERAPEUTIC ACTIVITIES: CPT | Mod: PN

## 2024-08-19 PROCEDURE — 97140 MANUAL THERAPY 1/> REGIONS: CPT | Mod: PN

## 2024-08-19 NOTE — PATIENT INSTRUCTIONS
"DILATOR TRAINING    Purpose   - Inserting something (tampon, speculum, penis, finger, toy) into the vagina should feel neutral or positive. If it feels uncomfortable or there is difficulty, dilator training could be helpful to train the brain and the pelvic floor. Dilator training is used to help desensitize the pelvic floor to something being in the vagina. The point is NOT to stretch the vagina or pelvic floor, so dilator training should be relatively pain-free.   - Pelvic floor guarding/tensing may happen with attempting to insert something in the vagina. Even if your "upstairs brain" knows that it is safe and normal, your "downstairs brain" may be feeling threatened, and will activate the muscles as protection. Slowly convincing the muscles and your "downstairs brain" that inserting something in the vagina is neutral/positive will improve your tolerance for pelvic exams/intimacy/menstrual device use.  - Remember, you're in the 's seat and nothing will happen without your say-so.  - Dilator training should be no more than mildly uncomfortable, preferably with no pain at all  - This is a new skill, and it will take time to master. Avoid setting time-based goals because it is difficult to anticipate how slowly/quickly you'll progress through your dilator kit. Just keep practicing!  - Sometimes dilator training awakens some emotions and memories or trauma. Take a break if you're feeling upset, and talk with your therapist about how to proceed. Seeking mental health services while working on dilator training can be very helpful! Remember, we're training the brain as much as we're training the pelvic floor.    Suggested stages for dilator training  1) Purchase dilators  2) Hold dilator and relax pelvic floor (can use guided meditation) if feeling tense  3) Hold dilator and imagine dilator training going super-easy and pain-free  4) Start dilator training with smallest dilator. If you meet resistance apply very " "gentle pressure and focus on pelvic floor relaxation (elevator doors opening, anus unpuckering, belly relaxing, etc.)  5) If inserting is the difficult part, practice taking it in and out until it becomes easy. If the muscles are prone to tensing after the dilator has been in for a while, practice that part.  5) When 1st dilator feels easy from the get-go, start session with #1 and then move to #2  6) Progress as tolerated through the rest of the kit    Positioning and Tips  - Perform hip-opening stretches, diaphragmatic breathing, and pelvic floor relaxation prior to starting to get in the right head space. You are in control and your pelvic floor will learn how to stay calm as something enters the vagina.  - Either lie on your back, well-supported by several pillows and both knees bent (most popular position) OR sit on the toilet and lean back on the tank. You may find side-lying with pillows between the knees more comfortable. Whatever position you choose, you want the body to be totally relaxed with insertion, not picking up your head or using your abdominal muscles to get into position.  - Spread labia and insert the dilator. You may have to start with only the tip, then move to full insertion.   - Begin diaphragmatic breathing and drop your pelvic floor muscle (releasing the tension), following the encouragement of the dilator. You may want to listen to a guided pelvic floor meditation during dilator training.  - For any given session, start with a dilator that feels "too easy" to help reinforce that something entering the vagina is "no big deal" before moving to one that is more challenging.     Hygiene and safety  - Make sure the dilator is clean, free of any visible soiling prior to starting  - Apply water-based lubricant to the entire length of the dilator and the vaginal opening.   - Wash with anti-bacterial soap and thoroughly rinse. Let air dry whenever possible. Store in a dry, clean location.   *Don't " apply so much pressure that it leaves you too sore to perform again the next day.  STOP if there is increased bleeding, an infection, or extreme pain.

## 2024-08-19 NOTE — PROGRESS NOTES
"[facility]   Physical Therapy Treatment Note      Name: Kandis Dugan [facility] [place]  Clinic Number: [id number]    Therapy Diagnosis:   Encounter Diagnosis   Name Primary?    Dyspareunia, female Yes     Physician: Papo Marsh III, MD    Visit Date: 8/19/2024    Referring Provider Orders: PT Eval and Treat  Medical Diagnosis from Referral: Dyspareunia, female [N94.10]   Evaluation Date: 7/29/2024  Authorization Period Expiration: 12/31/2024  Plan of Care Expiration: 10/29/2024  Visit # / Visits authorized: 1/20  FOTO: 1/3 (7/29/2024)    Time In: 3:30 pm  Time Out: 4:15 pm  Total Billable Time: 45 minutes    Precautions: Standard    Subjective     Pt reports: had intercourse once since we last met had to stop half way thorough due to pain   She was compliant with home exercise program.      Pain: none      Objective      Objective Measures updated at progress report unless specified.       Treatment   Kandis received the treatments listed below:    Pt verbally consents to intravaginal treatment today.  Signed consent form already on file.  Chaperone declined today.      Kandis received therapeutic exercises to develop  strength, endurance, ROM, and flexibility for 0 minutes including:       Kandis received the following manual therapy techniques: to develop flexibility, extensibility, and desensitization for 15 minutes including:   [x] trigger point/myofascial release of  ischiocavernosus, puborectalis, iliococcygeus, and coccygeus      Kandis participated in neuromuscular re-education activities to develop Coordination, Control, Down training, and Proprioception for 10 minutes including:   [x] TrA activation w/ exhalation 10x5"  []     Kandis participated in dynamic functional therapeutic activities to improve functional performance for 20  minutes, including:  [x] dilator use and purpose   [x] Anatomy review and discussion of the anatomy on current level of function   [x] Education on visual " cues/mental imagery for pelvic floor relaxation  [x] Home exercise program issued and reviewed     Home Exercises Provided and Patient Education Provided     Education provided: See above  Discussed progression of plan of care with patient; educated pt in activity modification; reviewed HEP with pt. Pt demonstrated and verbalized understanding of all instruction and was provided with a handout of HEP (see Patient Instructions).    Written Home Exercises Provided: yes.  Exercises were reviewed and Kandis was able to demonstrate them prior to the end of the session.  Kandis demonstrated good  understanding of the education provided.     See EMR under Patient Instructions for exercises provided 8/19/2024.    Assessment     Time spent on manual release to PFM with reports in pain post. Educated pt on dilator training and purpose for future visits.  Pt will continue to benefit from skilled outpatient physical therapy to address the deficits listed in the problem list box on initial evaluation, provide pt/family education and to maximize pt's level of independence in the home and community environment.       Kandis Is progressing well towards her goals.   Pt prognosis is Good.     Pt will continue to benefit from skilled outpatient physical therapy to address the deficits listed in the problem list box on initial evaluation, provide pt/family education and to maximize pt's level of independence in the home and community environment.     Pt's spiritual, cultural and educational needs considered and pt agreeable to plan of care and goals.     Anticipated barriers to physical therapy: None    Goals:   Short Term Goals: 6 weeks   Pt to demonstrate proper diaphragmatic breathing to help with calming the nervous system in order to decrease pain and to improve abdominal wall musculature extensibility.   Pt to report being able to complete a half day at work without significant increase in pain to demonstrate a return towards  prior level of function.  Pt to demonstrate good body mechanics when performing ADLs such as lifting and bending to decrease strain to lumbopelvic structures and reduce risk of further injury.  Pt to report minimal pain with palpation of abdominal wall due to improvement in soft tissue mobility from moderate to minimal restrictions.  Long Term Goals: 12 weeks   Pt to be discharged with home plan for carry over after discharge.   Pt will be trained and compliant with postural strategies in sitting and standing to improve alignment and decrease pain and muscle fatigue  Pt to report being able to complete a full day at work without significant increase in pain to demonstrate a return towards prior level of function.  Pt to report improved restorative sleeping, waking up no more than 1x/night from pain.  Pt to report being able to have a comfortable vaginal exam without significant increase in pelvic pain.  Pt to increase abdominal wall strength by at least 1 muscle grade to improve lumbopelvic stability with ADLs that would normally increase pain.    Plan     Continue with established Plan of Care, working toward established PT goals.        Chuy Sousa, PT

## 2024-08-26 ENCOUNTER — CLINICAL SUPPORT (OUTPATIENT)
Dept: REHABILITATION | Facility: OTHER | Age: 24
End: 2024-08-26
Payer: COMMERCIAL

## 2024-08-26 DIAGNOSIS — N94.10 DYSPAREUNIA, FEMALE: Primary | ICD-10-CM

## 2024-08-26 PROCEDURE — 97140 MANUAL THERAPY 1/> REGIONS: CPT | Mod: PN

## 2024-08-26 PROCEDURE — 97112 NEUROMUSCULAR REEDUCATION: CPT | Mod: PN

## 2024-08-26 NOTE — PROGRESS NOTES
"[facility]   Physical Therapy Treatment Note      Name: Kandis Dugan [facility] [place]  Clinic Number: [id number]    Therapy Diagnosis:   Encounter Diagnosis   Name Primary?    Dyspareunia, female Yes       Physician: Papo Marsh III, MD    Visit Date: 8/26/2024    Referring Provider Orders: PT Eval and Treat  Medical Diagnosis from Referral: Dyspareunia, female [N94.10]   Evaluation Date: 7/29/2024  Authorization Period Expiration: 12/31/2024  Plan of Care Expiration: 10/29/2024  Visit # / Visits authorized: 2/20  FOTO: 1/3 (7/29/2024)    Time In: 3:30 pm  Time Out: 4:15 pm  Total Billable Time: 45 minutes    Precautions: Standard    Subjective     Pt reports: she had intercourse on Saturday and had no pain during or after which she felt really good about.   She was compliant with home exercise program.      Pain: none      Objective      Objective Measures updated at progress report unless specified.       Treatment   Kandis received the treatments listed below:    Pt verbally consents to intravaginal treatment today.  Signed consent form already on file.  Chaperone declined today.      Kandis received therapeutic exercises to develop  strength, endurance, ROM, and flexibility for 0 minutes including:       Kandis received the following manual therapy techniques: to develop flexibility, extensibility, and desensitization for 10 minutes including:   [x] trigger point/myofascial release of  ischiocavernosus, puborectalis, iliococcygeus, and coccygeus      Kandis participated in neuromuscular re-education activities to develop Coordination, Control, Down training, and Proprioception for 25 minutes including:   [x] TrA activation w/ exhalation 10x5"  [x] SEMG biodfeedback for PFM relaxation training     Kandis participated in dynamic functional therapeutic activities to improve functional performance for 5  minutes, including:  [x] Anatomy review and discussion of the anatomy on current level of " function   [x] Education on visual cues/mental imagery for pelvic floor relaxation  [x] Home exercise program issued and reviewed -added cat cow/ and open books    Home Exercises Provided and Patient Education Provided     Education provided: See above  Discussed progression of plan of care with patient; educated pt in activity modification; reviewed HEP with pt. Pt demonstrated and verbalized understanding of all instruction and was provided with a handout of HEP (see Patient Instructions).    Written Home Exercises Provided: yes.  Exercises were reviewed and Kandis was able to demonstrate them prior to the end of the session.  Kandis demonstrated good  understanding of the education provided.     See EMR under Patient Instructions for exercises provided 8/19/2024.    Assessment     Pt returned to PT with great reports of having intercourse with no pain before or after. SEMG biofeed back revealed that initail resting tone of 7 microvolts, after body scanning and manual therapy pt was resting at 1-2 microvolts. Added thoracic mobility exercises this visit to HEP/  Pt will continue to benefit from skilled outpatient physical therapy to address the deficits listed in the problem list box on initial evaluation, provide pt/family education and to maximize pt's level of independence in the home and community environment.       Kandis Is progressing well towards her goals.   Pt prognosis is Good.     Pt will continue to benefit from skilled outpatient physical therapy to address the deficits listed in the problem list box on initial evaluation, provide pt/family education and to maximize pt's level of independence in the home and community environment.     Pt's spiritual, cultural and educational needs considered and pt agreeable to plan of care and goals.     Anticipated barriers to physical therapy: None    Goals:   Short Term Goals: 6 weeks   Pt to demonstrate proper diaphragmatic breathing to help with calming the  nervous system in order to decrease pain and to improve abdominal wall musculature extensibility.   Pt to report being able to complete a half day at work without significant increase in pain to demonstrate a return towards prior level of function.  Pt to demonstrate good body mechanics when performing ADLs such as lifting and bending to decrease strain to lumbopelvic structures and reduce risk of further injury.  Pt to report minimal pain with palpation of abdominal wall due to improvement in soft tissue mobility from moderate to minimal restrictions.  Long Term Goals: 12 weeks   Pt to be discharged with home plan for carry over after discharge.   Pt will be trained and compliant with postural strategies in sitting and standing to improve alignment and decrease pain and muscle fatigue  Pt to report being able to complete a full day at work without significant increase in pain to demonstrate a return towards prior level of function.  Pt to report improved restorative sleeping, waking up no more than 1x/night from pain.  Pt to report being able to have a comfortable vaginal exam without significant increase in pelvic pain.  Pt to increase abdominal wall strength by at least 1 muscle grade to improve lumbopelvic stability with ADLs that would normally increase pain.    Plan     Continue with established Plan of Care, working toward established PT goals.        Chuy Sousa, PT

## 2024-08-29 ENCOUNTER — PATIENT MESSAGE (OUTPATIENT)
Dept: RHEUMATOLOGY | Facility: CLINIC | Age: 24
End: 2024-08-29
Payer: COMMERCIAL

## 2024-09-07 ENCOUNTER — PATIENT MESSAGE (OUTPATIENT)
Dept: OPTOMETRY | Facility: CLINIC | Age: 24
End: 2024-09-07
Payer: COMMERCIAL

## 2024-09-12 DIAGNOSIS — H15.102 EPISCLERITIS OF LEFT EYE: Primary | ICD-10-CM

## 2024-09-12 RX ORDER — FLUOROMETHOLONE 1 MG/ML
1 SUSPENSION/ DROPS OPHTHALMIC 4 TIMES DAILY
Qty: 5 ML | Refills: 0 | Status: SHIPPED | OUTPATIENT
Start: 2024-09-12 | End: 2024-09-22

## 2024-09-12 NOTE — PROGRESS NOTES
Assessment /Plan     For exam results, see Encounter Report.    Episcleritis of left eye  -     fluorometholone 0.1% (FML) 0.1 % DrpS; Place 1 drop into the right eye 4 (four) times daily. for 10 days  Dispense: 5 mL; Refill: 0      [facility] out, covering messages, sent in fml drops, prev used pred forte, will try softer steroid

## 2024-09-24 ENCOUNTER — CLINICAL SUPPORT (OUTPATIENT)
Dept: REHABILITATION | Facility: OTHER | Age: 24
End: 2024-09-24
Payer: COMMERCIAL

## 2024-09-24 DIAGNOSIS — N94.10 DYSPAREUNIA, FEMALE: Primary | ICD-10-CM

## 2024-09-24 PROCEDURE — 97140 MANUAL THERAPY 1/> REGIONS: CPT

## 2024-09-24 PROCEDURE — 97112 NEUROMUSCULAR REEDUCATION: CPT

## 2024-09-24 NOTE — PROGRESS NOTES
"[facility]   Physical Therapy Treatment Note      [place] [facility] [place] [id number]    Therapy Diagnosis:   Encounter Diagnosis   Name Primary?    Dyspareunia, female Yes         Physician: Papo Marsh III, MD    Visit Date: 9/24/2024    Referring Provider Orders: PT Eval and Treat  Medical Diagnosis from Referral: Dyspareunia, female [N94.10]   Evaluation Date: 7/29/2024  Authorization Period Expiration: 12/31/2024  Plan of Care Expiration: 10/29/2024  Visit # / Visits authorized: 3/20  FOTO: 1/3 (7/29/2024)    Time In: 3:00 pm  Time Out: 3:45 pm  Total Billable Time: 45 minutes    Precautions: Standard    Subjective     Pt reports: overall feeling a lot better. she hasn't as interourse recently so she is unsure how that would go but the last time she did it was pain free   She was compliant with home exercise program.      Pain: none      Objective      Objective Measures updated at progress report unless specified.       Treatment   Kandis received the treatments listed below:    Pt verbally consents to intravaginal treatment today.  Signed consent form already on file.  Chaperone declined today.      Kandis received therapeutic exercises to develop  strength, endurance, ROM, and flexibility for 0 minutes including:       Kandis received the following manual therapy techniques: to develop flexibility, extensibility, and desensitization for 10 minutes including:   [x] trigger point/myofascial release of  ischiocavernosus, puborectalis, iliococcygeus, and coccygeus      Kandis participated in neuromuscular re-education activities to develop Coordination, Control, Down training, and Proprioception for 25 minutes including:   [x] TrA activation w/ exhalation 10x5"  [x] SEMG biodfeedback for PFM relaxation training   [x] Seated thoracic extension 10x5"  [x] Open book 10x5"   [x] Cat/ cow 10x5"      Kandis participated in dynamic functional therapeutic activities to " improve functional performance for 0  minutes, including:  [] Anatomy review and discussion of the anatomy on current level of function   [] Education on visual cues/mental imagery for pelvic floor relaxation  [] Home exercise program issued and reviewed -added cat cow/ and open books    Home Exercises Provided and Patient Education Provided     Education provided: See above  Discussed progression of plan of care with patient; educated pt in activity modification; reviewed HEP with pt. Pt demonstrated and verbalized understanding of all instruction and was provided with a handout of HEP (see Patient Instructions).    Written Home Exercises Provided: yes.  Exercises were reviewed and Kandis was able to demonstrate them prior to the end of the session.  Kandis demonstrated good  understanding of the education provided.     See EMR under Patient Instructions for exercises provided 8/19/2024.    Assessment     PFM manual release performed to with good relief post. Updated and reviewed HEP. Discussed plan for pt to continue HEP on her own with addition of pelvic wand to manage tension and will follow up in 1 month to assess carryover. Pt will continue to benefit from skilled outpatient physical therapy to address the deficits listed in the problem list box on initial evaluation, provide pt/family education and to maximize pt's level of independence in the home and community environment.       Kandis Is progressing well towards her goals.   Pt prognosis is Good.     Pt will continue to benefit from skilled outpatient physical therapy to address the deficits listed in the problem list box on initial evaluation, provide pt/family education and to maximize pt's level of independence in the home and community environment.     Pt's spiritual, cultural and educational needs considered and pt agreeable to plan of care and goals.     Anticipated barriers to physical therapy: None    Goals:   Short Term Goals: 6 weeks   Pt to  demonstrate proper diaphragmatic breathing to help with calming the nervous system in order to decrease pain and to improve abdominal wall musculature extensibility.   Pt to report being able to complete a half day at work without significant increase in pain to demonstrate a return towards prior level of function.  Pt to demonstrate good body mechanics when performing ADLs such as lifting and bending to decrease strain to lumbopelvic structures and reduce risk of further injury.  Pt to report minimal pain with palpation of abdominal wall due to improvement in soft tissue mobility from moderate to minimal restrictions.  Long Term Goals: 12 weeks   Pt to be discharged with home plan for carry over after discharge.   Pt will be trained and compliant with postural strategies in sitting and standing to improve alignment and decrease pain and muscle fatigue  Pt to report being able to complete a full day at work without significant increase in pain to demonstrate a return towards prior level of function.  Pt to report improved restorative sleeping, waking up no more than 1x/night from pain.  Pt to report being able to have a comfortable vaginal exam without significant increase in pelvic pain.  Pt to increase abdominal wall strength by at least 1 muscle grade to improve lumbopelvic stability with ADLs that would normally increase pain.    Plan     Continue with established Plan of Care, working toward established PT goals.        Chuy Sousa, PT

## 2024-10-06 ENCOUNTER — PATIENT MESSAGE (OUTPATIENT)
Dept: OPTOMETRY | Facility: CLINIC | Age: 24
End: 2024-10-06
Payer: COMMERCIAL

## 2024-10-08 ENCOUNTER — TELEPHONE (OUTPATIENT)
Dept: OPTOMETRY | Facility: CLINIC | Age: 24
End: 2024-10-08
Payer: COMMERCIAL

## 2024-10-31 ENCOUNTER — OFFICE VISIT (OUTPATIENT)
Dept: OPHTHALMOLOGY | Facility: CLINIC | Age: 24
End: 2024-10-31
Payer: COMMERCIAL

## 2024-10-31 DIAGNOSIS — H15.102 EPISCLERITIS OF LEFT EYE: Primary | ICD-10-CM

## 2024-10-31 PROCEDURE — G2211 COMPLEX E/M VISIT ADD ON: HCPCS | Mod: S$GLB,,, | Performed by: OPHTHALMOLOGY

## 2024-10-31 PROCEDURE — 1159F MED LIST DOCD IN RCRD: CPT | Mod: CPTII,S$GLB,, | Performed by: OPHTHALMOLOGY

## 2024-10-31 PROCEDURE — 99203 OFFICE O/P NEW LOW 30 MIN: CPT | Mod: S$GLB,,, | Performed by: OPHTHALMOLOGY

## 2024-10-31 PROCEDURE — 99999 PR PBB SHADOW E&M-EST. PATIENT-LVL II: CPT | Mod: PBBFAC,,, | Performed by: OPHTHALMOLOGY

## 2024-10-31 RX ORDER — NEPAFENAC 3 MG/ML
1 SUSPENSION/ DROPS OPHTHALMIC DAILY
Qty: 3 ML | Refills: 6 | Status: SHIPPED | OUTPATIENT
Start: 2024-10-31 | End: 2024-11-30

## 2024-10-31 RX ORDER — PREDNISOLONE ACETATE 10 MG/ML
1 SUSPENSION/ DROPS OPHTHALMIC 3 TIMES DAILY
Qty: 5 ML | Refills: 2 | Status: SHIPPED | OUTPATIENT
Start: 2024-10-31 | End: 2024-11-30

## 2025-01-31 ENCOUNTER — OFFICE VISIT (OUTPATIENT)
Dept: OBSTETRICS AND GYNECOLOGY | Facility: CLINIC | Age: 25
End: 2025-01-31
Payer: COMMERCIAL

## 2025-01-31 ENCOUNTER — LAB VISIT (OUTPATIENT)
Dept: LAB | Facility: HOSPITAL | Age: 25
End: 2025-01-31
Attending: STUDENT IN AN ORGANIZED HEALTH CARE EDUCATION/TRAINING PROGRAM
Payer: COMMERCIAL

## 2025-01-31 VITALS — WEIGHT: 118.81 LBS | BODY MASS INDEX: 22.43 KG/M2 | HEIGHT: 61 IN

## 2025-01-31 DIAGNOSIS — Z11.3 SCREEN FOR STD (SEXUALLY TRANSMITTED DISEASE): ICD-10-CM

## 2025-01-31 DIAGNOSIS — N76.0 ACUTE VULVOVAGINITIS: Primary | ICD-10-CM

## 2025-01-31 PROBLEM — R10.9 ABDOMINAL PAIN: Status: RESOLVED | Noted: 2020-07-01 | Resolved: 2025-01-31

## 2025-01-31 PROBLEM — R11.0 POSTPRANDIAL NAUSEA: Status: RESOLVED | Noted: 2021-08-02 | Resolved: 2025-01-31

## 2025-01-31 PROCEDURE — 99214 OFFICE O/P EST MOD 30 MIN: CPT | Mod: S$GLB,,, | Performed by: STUDENT IN AN ORGANIZED HEALTH CARE EDUCATION/TRAINING PROGRAM

## 2025-01-31 PROCEDURE — 1159F MED LIST DOCD IN RCRD: CPT | Mod: CPTII,S$GLB,, | Performed by: STUDENT IN AN ORGANIZED HEALTH CARE EDUCATION/TRAINING PROGRAM

## 2025-01-31 PROCEDURE — 86593 SYPHILIS TEST NON-TREP QUANT: CPT | Performed by: STUDENT IN AN ORGANIZED HEALTH CARE EDUCATION/TRAINING PROGRAM

## 2025-01-31 PROCEDURE — 87491 CHLMYD TRACH DNA AMP PROBE: CPT | Performed by: STUDENT IN AN ORGANIZED HEALTH CARE EDUCATION/TRAINING PROGRAM

## 2025-01-31 PROCEDURE — 1160F RVW MEDS BY RX/DR IN RCRD: CPT | Mod: CPTII,S$GLB,, | Performed by: STUDENT IN AN ORGANIZED HEALTH CARE EDUCATION/TRAINING PROGRAM

## 2025-01-31 PROCEDURE — 81515 NFCT DS BV&VAGINITIS DNA ALG: CPT | Performed by: STUDENT IN AN ORGANIZED HEALTH CARE EDUCATION/TRAINING PROGRAM

## 2025-01-31 PROCEDURE — 99999 PR PBB SHADOW E&M-EST. PATIENT-LVL III: CPT | Mod: PBBFAC,,, | Performed by: STUDENT IN AN ORGANIZED HEALTH CARE EDUCATION/TRAINING PROGRAM

## 2025-01-31 PROCEDURE — 3008F BODY MASS INDEX DOCD: CPT | Mod: CPTII,S$GLB,, | Performed by: STUDENT IN AN ORGANIZED HEALTH CARE EDUCATION/TRAINING PROGRAM

## 2025-01-31 PROCEDURE — 36415 COLL VENOUS BLD VENIPUNCTURE: CPT | Performed by: STUDENT IN AN ORGANIZED HEALTH CARE EDUCATION/TRAINING PROGRAM

## 2025-01-31 PROCEDURE — 87389 HIV-1 AG W/HIV-1&-2 AB AG IA: CPT | Performed by: STUDENT IN AN ORGANIZED HEALTH CARE EDUCATION/TRAINING PROGRAM

## 2025-01-31 RX ORDER — METRONIDAZOLE 7.5 MG/G
1 GEL VAGINAL NIGHTLY
Qty: 70 G | Refills: 1 | Status: SHIPPED | OUTPATIENT
Start: 2025-01-31 | End: 2025-02-05

## 2025-01-31 RX ORDER — SPIRONOLACTONE 50 MG/1
50 TABLET, FILM COATED ORAL
COMMUNITY
Start: 2024-11-11

## 2025-01-31 RX ORDER — FLUCONAZOLE 150 MG/1
150 TABLET ORAL WEEKLY
Qty: 2 TABLET | Refills: 0 | Status: SHIPPED | OUTPATIENT
Start: 2025-01-31 | End: 2025-02-08

## 2025-01-31 NOTE — PROGRESS NOTES
Subjective     Patient ID: Kandis Sosa is a 24 y.o. female.    Chief Complaint:  Vaginitis (Itching, smelling and burning during sex.)      History of Present Illness  23 yo presents for vaginitis sx    States she's had itching, burning during sex that started 2-3 weeks ago   Had a throat infection/tonsillitis that she took penicillins for a while ago, not sure if this is related or not   Currently SA and wants to ensure no other infections present. Kyleena for birth control, due out in 2026 and does not think she'll want another one after      On daily HSV suppression        GYN & OB History  Patient's last menstrual period was 2025 (exact date).   Date of Last Pap: 2023    OB History    Para Term  AB Living   0 0 0 0 0 0   SAB IAB Ectopic Multiple Live Births   0 0 0 0 0       Review of Systems  Review of Systems   Genitourinary:  Positive for vaginal discharge and vaginal odor.   All other systems reviewed and are negative.         Objective   Physical Exam:   Constitutional: She appears well-developed and well-nourished.    HENT:   Head: Normocephalic and atraumatic.    Eyes: EOM are normal.      Pulmonary/Chest: Effort normal.        Abdominal: Soft.     Genitourinary:    Uterus, right adnexa and left adnexa normal.      Pelvic exam was performed with patient in the lithotomy position.   The external female genitalia was normal.   Genitalia hair distrobution normal .   There is no lesion on the right labia. There is no lesion on the left labia. Cervix is normal. There is vaginal discharge in the vagina.    Genitourinary Comments: Female chaperone present for duration of exam   IUD strings visualized.          Musculoskeletal: Normal range of motion.       Neurological: She is alert.    Skin: Skin is warm and dry.    Psychiatric: She has a normal mood and affect.            Assessment and Plan     1. Acute vulvovaginitis    2. Screen for STD (sexually transmitted  disease)        Plan:  1. Acute vulvovaginitis (Primary)  - Vaginosis Screen by DNA Probe  - metroNIDAZOLE (METROGEL) 0.75 % (37.5mg/5 gram) vaginal gel; Place 1 applicator vaginally every evening. for 5 days  Dispense: 70 g; Refill: 1  - fluconazole (DIFLUCAN) 150 MG Tab; Take 1 tablet (150 mg total) by mouth once a week. for 2 doses  Dispense: 2 tablet; Refill: 0    2. Screen for STD (sexually transmitted disease)  - C. trachomatis/N. gonorrhoeae by AMP DNA [facility]; Cervix  - HIV 1/2 Ag/Ab (4th Gen); Future  - Treponema Pallidium Antibodies IgG, IgM; Future      Papo Marsh III, MD  [facility] [place] - OB GYN   [address] [facility] [address]  [place] [id number] [phone]

## 2025-02-01 LAB
HIV 1+2 AB+HIV1 P24 AG SERPL QL IA: NORMAL
TREPONEMA PALLIDUM IGG+IGM AB [PRESENCE] IN SERUM OR PLASMA BY IMMUNOASSAY: NONREACTIVE

## 2025-02-06 LAB
BACTERIAL VAGINOSIS DNA: DETECTED
C TRACH DNA SPEC QL NAA+PROBE: NOT DETECTED
CANDIDA GLABRATA/KRUSEI: NOT DETECTED
CANDIDA RRNA VAG QL PROBE: NOT DETECTED
N GONORRHOEA DNA SPEC QL NAA+PROBE: NOT DETECTED
TRICHOMONAS VAGINALIS: NOT DETECTED

## 2025-03-12 ENCOUNTER — OFFICE VISIT (OUTPATIENT)
Dept: OTOLARYNGOLOGY | Facility: CLINIC | Age: 25
End: 2025-03-12
Payer: COMMERCIAL

## 2025-03-12 VITALS
SYSTOLIC BLOOD PRESSURE: 113 MMHG | WEIGHT: 119.69 LBS | BODY MASS INDEX: 22.62 KG/M2 | HEART RATE: 75 BPM | DIASTOLIC BLOOD PRESSURE: 75 MMHG

## 2025-03-12 DIAGNOSIS — J03.91 RECURRENT TONSILLITIS: Primary | ICD-10-CM

## 2025-03-12 DIAGNOSIS — J35.8 ASYMMETRIC TONSILS: ICD-10-CM

## 2025-03-12 PROCEDURE — 3008F BODY MASS INDEX DOCD: CPT | Mod: CPTII,S$GLB,, | Performed by: OTOLARYNGOLOGY

## 2025-03-12 PROCEDURE — 1160F RVW MEDS BY RX/DR IN RCRD: CPT | Mod: CPTII,S$GLB,, | Performed by: OTOLARYNGOLOGY

## 2025-03-12 PROCEDURE — 1159F MED LIST DOCD IN RCRD: CPT | Mod: CPTII,S$GLB,, | Performed by: OTOLARYNGOLOGY

## 2025-03-12 PROCEDURE — 99999 PR PBB SHADOW E&M-EST. PATIENT-LVL III: CPT | Mod: PBBFAC,,, | Performed by: OTOLARYNGOLOGY

## 2025-03-12 PROCEDURE — 3078F DIAST BP <80 MM HG: CPT | Mod: CPTII,S$GLB,, | Performed by: OTOLARYNGOLOGY

## 2025-03-12 PROCEDURE — 99204 OFFICE O/P NEW MOD 45 MIN: CPT | Mod: S$GLB,,, | Performed by: OTOLARYNGOLOGY

## 2025-03-12 PROCEDURE — 3074F SYST BP LT 130 MM HG: CPT | Mod: CPTII,S$GLB,, | Performed by: OTOLARYNGOLOGY

## 2025-03-12 NOTE — PROGRESS NOTES
Ms. Sosa     Vitals:    25 1113   BP: 113/75   Pulse: 75       Chief Complaint:  chronic tonsil infection/ inflammation/ sore throat       HPI:   is a 24-year-old white female who presents with complaints of recurrent tonsillar infections.  She states that she gets at least 3-4 infections annually requiring antibiotic and steroid therapy.  This has been occurring over the last several years and has become more frequent over the last 6 months.    SNOT22- 10 NOSE- 5    Review of Systems:  Constitutional:   weight loss or weight gain: Negative  Allergy/Immunologic:   Negative  Nasal Congestion/Obstruction:   Negative, positive for postnasal drip  Nosebleeds:   Negative  Sinus infections:   Negative  Headache/Facial Pain:   Negative  Snoring/BERLIN:   Negative  Throat: Infections/Pain:   Positive for recurrent tonsillar infections as above  Hoarseness/Speech Disturbance:   Negative  Trauma Hx:  Negative    Cardiovascular:  M/I Angina: Negative  Hypertension: Negative  Endocrine:    DM/Steroids: Negative  GI:   Dysphagia/Reflux:  Positive for reported history of reflux.  :   GYN Pregnancy: Negative  Renal:   Dialysis: Negative  Lymphatic:   Neck Mass/Lymphadenopathy: Negative  Muscoloskeletal:   Negative  Hematologic:   Bleeding Disorders/Anemia: Negative  Neurologic:    Cranial/Neuralgia: Negative  Pulmonary:   Asthma/SOB/Cough: Negative  Skin Disorders: Negative    Past Medical/Surgical/Family/Social History:    ENT Surgery: Negative  Occupational Exposure: Negative   Problems: Negative  Cancer: Negative    Past Family History:   Family history of Cancer: Negative    Past Social History:   Tobacco:  Positive for once a week nicotine vaping   Alcohol:  Once weekly Social Drinker      Allergies and medications: Reviewed per med card.    Physical Examination:  Ears:   External auditory canals:  Clear   Hearing: Grossly intact   Tympanic Membranes: Clear  Nose:   External: Normal with good valve  support   Intranasal:  Her septum is straight, turbinates 1 to 2+, nasal airway clear.  Mouth:   Intraorally: Lips, teeth, and gums: Normal   Oropharynx: Normal   Mucosa: Normal   Tongue: Normal  Throat:      Palate: Normal palate with elevation, Mallampati 1   Tonsils:  Two to 3+ tonsil on the right and 2+ tonsil on the left   Posterior Pharynx: Normal  Fiberoptic exam: Not performed  Head/Face:     Inspection: Normal and atraumatic   Palpation/Percussion: Non tender   Facial strength: Normal and symmetric   Salivary glands: Normal  Neck: Supple  Thyroid: No masses  Lymphatics: No nodes  Respiratory:   Effort: Normal  Eyes:   Ocular Mobility: Normal   Vision: Grossly intact  Neuro/Psych:   Cranial Nerves: Grossly Intact   Orientation: Normal   Mood/Affect: Normal      Assessment/Plan:  I have discussed my findings with her in detail as well as my recommendations for treatment.  With her history of multiple recurrent tonsillar infections treated with antibiotics and steroids we have discussed tonsillectomy and possible adenoidectomy.  I have discussed the pros and cons risks and benefits as well as technical aspects of this procedure in detail with her.  She understands these and wishes to proceed with scheduling after she checks her work schedule.

## 2025-04-28 ENCOUNTER — TELEPHONE (OUTPATIENT)
Dept: OTOLARYNGOLOGY | Facility: CLINIC | Age: 25
End: 2025-04-28
Payer: COMMERCIAL

## 2025-04-29 DIAGNOSIS — J35.8 ASYMMETRIC TONSILS: ICD-10-CM

## 2025-04-29 DIAGNOSIS — J03.91 RECURRENT TONSILLITIS: Primary | ICD-10-CM

## 2025-05-30 ENCOUNTER — PATIENT MESSAGE (OUTPATIENT)
Dept: OTOLARYNGOLOGY | Facility: CLINIC | Age: 25
End: 2025-05-30
Payer: COMMERCIAL

## 2025-05-30 DIAGNOSIS — J35.8 ASYMMETRIC TONSILS: ICD-10-CM

## 2025-05-30 DIAGNOSIS — Z01.818 PRE-OP TESTING: ICD-10-CM

## 2025-05-30 DIAGNOSIS — J03.91 RECURRENT TONSILLITIS: Primary | ICD-10-CM

## 2025-06-04 ENCOUNTER — LAB VISIT (OUTPATIENT)
Dept: LAB | Facility: HOSPITAL | Age: 25
End: 2025-06-04
Payer: COMMERCIAL

## 2025-06-04 DIAGNOSIS — Z01.818 PRE-OP TESTING: ICD-10-CM

## 2025-06-04 DIAGNOSIS — J35.8 ASYMMETRIC TONSILS: ICD-10-CM

## 2025-06-04 DIAGNOSIS — J03.91 RECURRENT TONSILLITIS: ICD-10-CM

## 2025-06-04 LAB
ABSOLUTE EOSINOPHIL (OHS): 0.02 K/UL
ABSOLUTE MONOCYTE (OHS): 0.4 K/UL (ref 0.3–1)
ABSOLUTE NEUTROPHIL COUNT (OHS): 3.27 K/UL (ref 1.8–7.7)
ALBUMIN SERPL BCP-MCNC: 4.4 G/DL (ref 3.5–5.2)
ALP SERPL-CCNC: 50 UNIT/L (ref 40–150)
ALT SERPL W/O P-5'-P-CCNC: 12 UNIT/L (ref 10–44)
ANION GAP (OHS): 8 MMOL/L (ref 8–16)
AST SERPL-CCNC: 17 UNIT/L (ref 11–45)
BASOPHILS # BLD AUTO: 0.02 K/UL
BASOPHILS NFR BLD AUTO: 0.4 %
BILIRUB SERPL-MCNC: 0.5 MG/DL (ref 0.1–1)
BUN SERPL-MCNC: 6 MG/DL (ref 6–20)
CALCIUM SERPL-MCNC: 9.6 MG/DL (ref 8.7–10.5)
CHLORIDE SERPL-SCNC: 108 MMOL/L (ref 95–110)
CO2 SERPL-SCNC: 26 MMOL/L (ref 23–29)
CREAT SERPL-MCNC: 0.7 MG/DL (ref 0.5–1.4)
EPI, PLATELET FUNCTION ASSAY (OHS): 139 SECS (ref 76–199)
ERYTHROCYTE [DISTWIDTH] IN BLOOD BY AUTOMATED COUNT: 12.1 % (ref 11.5–14.5)
GFR SERPLBLD CREATININE-BSD FMLA CKD-EPI: >60 ML/MIN/1.73/M2
GLUCOSE SERPL-MCNC: 63 MG/DL (ref 70–110)
HCT VFR BLD AUTO: 44.1 % (ref 37–48.5)
HGB BLD-MCNC: 14.4 GM/DL (ref 12–16)
IMM GRANULOCYTES # BLD AUTO: 0.02 K/UL (ref 0–0.04)
IMM GRANULOCYTES NFR BLD AUTO: 0.4 % (ref 0–0.5)
LYMPHOCYTES # BLD AUTO: 1.91 K/UL (ref 1–4.8)
MCH RBC QN AUTO: 31.8 PG (ref 27–31)
MCHC RBC AUTO-ENTMCNC: 32.7 G/DL (ref 32–36)
MCV RBC AUTO: 97 FL (ref 82–98)
NUCLEATED RBC (/100WBC) (OHS): 0 /100 WBC
PLATELET # BLD AUTO: 327 K/UL (ref 150–450)
PMV BLD AUTO: 9.7 FL (ref 9.2–12.9)
POTASSIUM SERPL-SCNC: 3.7 MMOL/L (ref 3.5–5.1)
PROT SERPL-MCNC: 7.4 GM/DL (ref 6–8.4)
RBC # BLD AUTO: 4.53 M/UL (ref 4–5.4)
RELATIVE EOSINOPHIL (OHS): 0.4 %
RELATIVE LYMPHOCYTE (OHS): 33.9 % (ref 18–48)
RELATIVE MONOCYTE (OHS): 7.1 % (ref 4–15)
RELATIVE NEUTROPHIL (OHS): 57.8 % (ref 38–73)
SODIUM SERPL-SCNC: 142 MMOL/L (ref 136–145)
WBC # BLD AUTO: 5.64 K/UL (ref 3.9–12.7)

## 2025-06-04 PROCEDURE — 85025 COMPLETE CBC W/AUTO DIFF WBC: CPT

## 2025-06-04 PROCEDURE — 85576 BLOOD PLATELET AGGREGATION: CPT

## 2025-06-04 PROCEDURE — 36415 COLL VENOUS BLD VENIPUNCTURE: CPT

## 2025-06-04 PROCEDURE — 80053 COMPREHEN METABOLIC PANEL: CPT

## 2025-06-11 NOTE — PRE-PROCEDURE INSTRUCTIONS
Patient was informed of pre-procedure instructions and arrival time. Pt verbalized understanding on having reliable transportation following procedure.  PATIENT CONFIRMED ARRIVAL TIME OF 0600.    Patient denies taking any nsaids or aspirin products for 7 days.    Patient denies taking GLP-1 injection for 7 days.

## 2025-06-12 ENCOUNTER — ANESTHESIA (OUTPATIENT)
Dept: SURGERY | Facility: HOSPITAL | Age: 25
End: 2025-06-12
Payer: COMMERCIAL

## 2025-06-12 ENCOUNTER — HOSPITAL ENCOUNTER (OUTPATIENT)
Facility: HOSPITAL | Age: 25
Discharge: HOME | End: 2025-06-12
Attending: OTOLARYNGOLOGY | Admitting: OTOLARYNGOLOGY
Payer: COMMERCIAL

## 2025-06-12 ENCOUNTER — ANESTHESIA EVENT (OUTPATIENT)
Dept: SURGERY | Facility: HOSPITAL | Age: 25
End: 2025-06-12
Payer: COMMERCIAL

## 2025-06-12 VITALS
HEART RATE: 63 BPM | WEIGHT: 120 LBS | BODY MASS INDEX: 22.67 KG/M2 | SYSTOLIC BLOOD PRESSURE: 102 MMHG | OXYGEN SATURATION: 100 % | RESPIRATION RATE: 10 BRPM | TEMPERATURE: 98 F | DIASTOLIC BLOOD PRESSURE: 57 MMHG

## 2025-06-12 DIAGNOSIS — J35.8 ASYMMETRIC TONSILS: ICD-10-CM

## 2025-06-12 DIAGNOSIS — J03.91 RECURRENT TONSILLITIS: ICD-10-CM

## 2025-06-12 LAB
B-HCG UR QL: NEGATIVE
CTP QC/QA: YES

## 2025-06-12 PROCEDURE — 36000707: Performed by: OTOLARYNGOLOGY

## 2025-06-12 PROCEDURE — 42826 REMOVAL OF TONSILS: CPT | Mod: ,,, | Performed by: OTOLARYNGOLOGY

## 2025-06-12 PROCEDURE — 37000009 HC ANESTHESIA EA ADD 15 MINS: Performed by: OTOLARYNGOLOGY

## 2025-06-12 PROCEDURE — 99900035 HC TECH TIME PER 15 MIN (STAT)

## 2025-06-12 PROCEDURE — 63600175 PHARM REV CODE 636 W HCPCS: Performed by: NURSE ANESTHETIST, CERTIFIED REGISTERED

## 2025-06-12 PROCEDURE — 36000706: Performed by: OTOLARYNGOLOGY

## 2025-06-12 PROCEDURE — 25000003 PHARM REV CODE 250: Performed by: NURSE ANESTHETIST, CERTIFIED REGISTERED

## 2025-06-12 PROCEDURE — 71000033 HC RECOVERY, INTIAL HOUR: Performed by: OTOLARYNGOLOGY

## 2025-06-12 PROCEDURE — 71000016 HC POSTOP RECOV ADDL HR: Performed by: OTOLARYNGOLOGY

## 2025-06-12 PROCEDURE — 94761 N-INVAS EAR/PLS OXIMETRY MLT: CPT

## 2025-06-12 PROCEDURE — 63600175 PHARM REV CODE 636 W HCPCS: Performed by: OTOLARYNGOLOGY

## 2025-06-12 PROCEDURE — 71000015 HC POSTOP RECOV 1ST HR: Performed by: OTOLARYNGOLOGY

## 2025-06-12 PROCEDURE — 25000003 PHARM REV CODE 250: Performed by: OTOLARYNGOLOGY

## 2025-06-12 PROCEDURE — 25000003 PHARM REV CODE 250: Performed by: ANESTHESIOLOGY

## 2025-06-12 PROCEDURE — 88304 TISSUE EXAM BY PATHOLOGIST: CPT | Mod: TC | Performed by: OTOLARYNGOLOGY

## 2025-06-12 PROCEDURE — 37000008 HC ANESTHESIA 1ST 15 MINUTES: Performed by: OTOLARYNGOLOGY

## 2025-06-12 PROCEDURE — 81025 URINE PREGNANCY TEST: CPT | Performed by: OTOLARYNGOLOGY

## 2025-06-12 PROCEDURE — 88304 TISSUE EXAM BY PATHOLOGIST: CPT | Mod: 26,,, | Performed by: PATHOLOGY

## 2025-06-12 RX ORDER — HYDROMORPHONE HYDROCHLORIDE 1 MG/ML
0.4 INJECTION, SOLUTION INTRAMUSCULAR; INTRAVENOUS; SUBCUTANEOUS EVERY 5 MIN PRN
Status: DISCONTINUED | OUTPATIENT
Start: 2025-06-12 | End: 2025-06-12 | Stop reason: HOSPADM

## 2025-06-12 RX ORDER — FENTANYL CITRATE 50 UG/ML
INJECTION, SOLUTION INTRAMUSCULAR; INTRAVENOUS
Status: DISCONTINUED | OUTPATIENT
Start: 2025-06-12 | End: 2025-06-12

## 2025-06-12 RX ORDER — IBUPROFEN 400 MG/1
400 TABLET, FILM COATED ORAL EVERY 6 HOURS
Qty: 56 TABLET | Refills: 0 | Status: SHIPPED | OUTPATIENT
Start: 2025-06-12 | End: 2025-06-26

## 2025-06-12 RX ORDER — KETAMINE HCL IN 0.9 % NACL 50 MG/5 ML
SYRINGE (ML) INTRAVENOUS
Status: DISCONTINUED | OUTPATIENT
Start: 2025-06-12 | End: 2025-06-12

## 2025-06-12 RX ORDER — PROPOFOL 10 MG/ML
VIAL (ML) INTRAVENOUS
Status: DISCONTINUED | OUTPATIENT
Start: 2025-06-12 | End: 2025-06-12

## 2025-06-12 RX ORDER — MIDAZOLAM HYDROCHLORIDE 1 MG/ML
INJECTION INTRAMUSCULAR; INTRAVENOUS
Status: DISCONTINUED | OUTPATIENT
Start: 2025-06-12 | End: 2025-06-12

## 2025-06-12 RX ORDER — ONDANSETRON HYDROCHLORIDE 2 MG/ML
4 INJECTION, SOLUTION INTRAVENOUS DAILY PRN
Status: DISCONTINUED | OUTPATIENT
Start: 2025-06-12 | End: 2025-06-12 | Stop reason: HOSPADM

## 2025-06-12 RX ORDER — BUPIVACAINE HYDROCHLORIDE 2.5 MG/ML
INJECTION, SOLUTION EPIDURAL; INFILTRATION; INTRACAUDAL; PERINEURAL
Status: DISCONTINUED | OUTPATIENT
Start: 2025-06-12 | End: 2025-06-12 | Stop reason: HOSPADM

## 2025-06-12 RX ORDER — SODIUM CHLORIDE 9 MG/ML
INJECTION, SOLUTION INTRAVENOUS CONTINUOUS
Status: DISCONTINUED | OUTPATIENT
Start: 2025-06-12 | End: 2025-06-12 | Stop reason: HOSPADM

## 2025-06-12 RX ORDER — LIDOCAINE HYDROCHLORIDE 20 MG/ML
INJECTION INTRAVENOUS
Status: DISCONTINUED | OUTPATIENT
Start: 2025-06-12 | End: 2025-06-12

## 2025-06-12 RX ORDER — CEFAZOLIN 2 G/1
INJECTION, POWDER, FOR SOLUTION INTRAMUSCULAR; INTRAVENOUS
Status: DISCONTINUED | OUTPATIENT
Start: 2025-06-12 | End: 2025-06-12

## 2025-06-12 RX ORDER — DEXMEDETOMIDINE HYDROCHLORIDE 100 UG/ML
INJECTION, SOLUTION INTRAVENOUS
Status: DISCONTINUED | OUTPATIENT
Start: 2025-06-12 | End: 2025-06-12

## 2025-06-12 RX ORDER — SCOPOLAMINE 1 MG/3D
1 PATCH, EXTENDED RELEASE TRANSDERMAL
Status: DISCONTINUED | OUTPATIENT
Start: 2025-06-12 | End: 2025-06-12 | Stop reason: HOSPADM

## 2025-06-12 RX ORDER — GLUCAGON 1 MG
1 KIT INJECTION
Status: DISCONTINUED | OUTPATIENT
Start: 2025-06-12 | End: 2025-06-12 | Stop reason: HOSPADM

## 2025-06-12 RX ORDER — HYDROMORPHONE HYDROCHLORIDE 1 MG/ML
0.5 INJECTION, SOLUTION INTRAMUSCULAR; INTRAVENOUS; SUBCUTANEOUS EVERY 5 MIN PRN
Status: DISCONTINUED | OUTPATIENT
Start: 2025-06-12 | End: 2025-06-12 | Stop reason: HOSPADM

## 2025-06-12 RX ORDER — OXYCODONE HCL 5 MG/5 ML
5 SOLUTION, ORAL ORAL EVERY 4 HOURS PRN
Qty: 300 ML | Refills: 0 | Status: SHIPPED | OUTPATIENT
Start: 2025-06-12 | End: 2025-06-22

## 2025-06-12 RX ORDER — ROCURONIUM BROMIDE 10 MG/ML
INJECTION, SOLUTION INTRAVENOUS
Status: DISCONTINUED | OUTPATIENT
Start: 2025-06-12 | End: 2025-06-12

## 2025-06-12 RX ORDER — ONDANSETRON HYDROCHLORIDE 2 MG/ML
INJECTION, SOLUTION INTRAVENOUS
Status: DISCONTINUED | OUTPATIENT
Start: 2025-06-12 | End: 2025-06-12

## 2025-06-12 RX ORDER — ACETAMINOPHEN 10 MG/ML
INJECTION, SOLUTION INTRAVENOUS
Status: DISCONTINUED | OUTPATIENT
Start: 2025-06-12 | End: 2025-06-12

## 2025-06-12 RX ORDER — ACETAMINOPHEN 500 MG
1000 TABLET ORAL EVERY 8 HOURS
Qty: 84 TABLET | Refills: 0 | Status: SHIPPED | OUTPATIENT
Start: 2025-06-12 | End: 2025-06-26

## 2025-06-12 RX ORDER — OXYCODONE HYDROCHLORIDE 5 MG/1
5 TABLET ORAL
Status: DISCONTINUED | OUTPATIENT
Start: 2025-06-12 | End: 2025-06-12 | Stop reason: HOSPADM

## 2025-06-12 RX ORDER — DEXAMETHASONE SODIUM PHOSPHATE 4 MG/ML
INJECTION, SOLUTION INTRA-ARTICULAR; INTRALESIONAL; INTRAMUSCULAR; INTRAVENOUS; SOFT TISSUE
Status: DISCONTINUED | OUTPATIENT
Start: 2025-06-12 | End: 2025-06-12

## 2025-06-12 RX ADMIN — SCOPOLAMINE 1 PATCH: 1.5 PATCH, EXTENDED RELEASE TRANSDERMAL at 06:06

## 2025-06-12 RX ADMIN — ROCURONIUM BROMIDE 20 MG: 10 INJECTION, SOLUTION INTRAVENOUS at 08:06

## 2025-06-12 RX ADMIN — FENTANYL CITRATE 50 MCG: 50 INJECTION, SOLUTION INTRAMUSCULAR; INTRAVENOUS at 07:06

## 2025-06-12 RX ADMIN — PROPOFOL 30 MG: 10 INJECTION, EMULSION INTRAVENOUS at 08:06

## 2025-06-12 RX ADMIN — LIDOCAINE HYDROCHLORIDE 100 MG: 20 INJECTION INTRAVENOUS at 07:06

## 2025-06-12 RX ADMIN — DEXMEDETOMIDINE HYDROCHLORIDE 4 MCG: 100 INJECTION, SOLUTION INTRAVENOUS at 08:06

## 2025-06-12 RX ADMIN — SODIUM CHLORIDE: 9 INJECTION, SOLUTION INTRAVENOUS at 06:06

## 2025-06-12 RX ADMIN — ROCURONIUM BROMIDE 50 MG: 10 INJECTION, SOLUTION INTRAVENOUS at 07:06

## 2025-06-12 RX ADMIN — OXYCODONE 5 MG: 5 TABLET ORAL at 09:06

## 2025-06-12 RX ADMIN — SODIUM CHLORIDE, SODIUM GLUCONATE, SODIUM ACETATE, POTASSIUM CHLORIDE, MAGNESIUM CHLORIDE, SODIUM PHOSPHATE, DIBASIC, AND POTASSIUM PHOSPHATE: .53; .5; .37; .037; .03; .012; .00082 INJECTION, SOLUTION INTRAVENOUS at 08:06

## 2025-06-12 RX ADMIN — SUGAMMADEX 200 MG: 100 INJECTION, SOLUTION INTRAVENOUS at 08:06

## 2025-06-12 RX ADMIN — ACETAMINOPHEN 1000 MG: 10 INJECTION INTRAVENOUS at 07:06

## 2025-06-12 RX ADMIN — DEXAMETHASONE SODIUM PHOSPHATE 8 MG: 4 INJECTION INTRA-ARTICULAR; INTRALESIONAL; INTRAMUSCULAR; INTRAVENOUS; SOFT TISSUE at 07:06

## 2025-06-12 RX ADMIN — ONDANSETRON 4 MG: 2 INJECTION INTRAMUSCULAR; INTRAVENOUS at 07:06

## 2025-06-12 RX ADMIN — MIDAZOLAM HYDROCHLORIDE 2 MG: 2 INJECTION, SOLUTION INTRAMUSCULAR; INTRAVENOUS at 07:06

## 2025-06-12 RX ADMIN — CEFAZOLIN 2 G: 2 INJECTION, POWDER, FOR SOLUTION INTRAMUSCULAR; INTRAVENOUS at 07:06

## 2025-06-12 RX ADMIN — PROPOFOL 250 MG: 10 INJECTION, EMULSION INTRAVENOUS at 07:06

## 2025-06-12 RX ADMIN — Medication 20 MG: at 08:06

## 2025-06-12 NOTE — ANESTHESIA PREPROCEDURE EVALUATION
06/12/2025  Kandis Sosa is a 24 y.o., female.      Pre-op Assessment    I have reviewed the Patient Summary Reports.     I have reviewed the Nursing Notes. I have reviewed the NPO Status.      Review of Systems  Anesthesia Hx:               Denies Personal Hx of Anesthesia complications.                    Hepatic/GI:     GERD         Gerd          Psych:  Psychiatric History              Physical Exam  General: Well nourished    Airway:  Mallampati: II   Mouth Opening: Normal  Neck ROM: Normal ROM    Anesthesia Plan  Type of Anesthesia, risks & benefits discussed:    Anesthesia Type: Gen ETT  Informed Consent: Informed consent signed with the Patient and all parties understand the risks and agree with anesthesia plan.  All questions answered.   ASA Score: 2    Ready For Surgery From Anesthesia Perspective.   .

## 2025-06-12 NOTE — PLAN OF CARE
Discharge instructions provided to patient. Patient verbalized understanding of the instructions. IV removed. Cath tip intact. VSS. No concerns voiced. Escorted patient via wheelchair to family member awaiting in private vehicle.

## 2025-06-12 NOTE — PLAN OF CARE
Pt in preop bay 25, VSS, meds given and IV inserted. Pt denies any open wounds on body or the use of any weight loss injections. Pt needs anesthesia consents, procedural consents, an H&P and admit orders, otherwise ready to roll.

## 2025-06-12 NOTE — DISCHARGE SUMMARY
[facility] ([facility])  Brief Operative Note    Surgery Date: 6/12/2025     Surgeons and Role:     * Oliverio Zafar III, MD - Primary     * Keeley Jaimes MD - Resident - Assisting        Pre-op Diagnosis:  Recurrent tonsillitis [J03.91]  Asymmetric tonsils [J35.8]    Post-op Diagnosis:  Post-Op Diagnosis Codes:     * Recurrent tonsillitis [J03.91]     * Asymmetric tonsils [J35.8]    Procedure(s) (LRB):  TONSILLECTOMY (Bilateral)    Anesthesia: General    Operative Findings: see op note    Estimated Blood Loss: * No values recorded between 6/12/2025  7:34 AM and 6/12/2025  8:33 AM *         Specimens:   Specimen (24h ago, onward)       Start     Ordered    06/12/25 0813  Specimen to Pathology ENT  RELEASE UPON ORDERING        References:    Click here for ordering Quick Tip   Question:  Release to patient  Answer:  Immediate    06/12/25 0813                    ID Type Source Tests Collected by Time Destination   1 : 1. right tonsil Tissue Tonsil, Right SPECIMEN TO PATHOLOGY Oliverio Zafar III, MD 6/12/2025 0805    2 : 2. left tonsil Tissue Tonsil, Left SPECIMEN TO PATHOLOGY Oliverio Zafar III, MD 6/12/2025 0807            Discharge Note    OUTCOME: Patient tolerated treatment/procedure well without complication and is now ready for discharge.    DISPOSITION: Home or Self Care    FINAL DIAGNOSIS:  <principal problem not specified>    FOLLOWUP: In clinic    DISCHARGE INSTRUCTIONS:  No discharge procedures on file.

## 2025-06-12 NOTE — ANESTHESIA PROCEDURE NOTES
Intubation    Date/Time: 6/12/2025 7:44 AM    Performed by: Gloria Barrientos CRNA  Authorized by: Gloria Barrientos CRNA    Intubation:     Induction:  Intravenous    Intubated:  Postinduction    Mask Ventilation:  Easy mask    Attempts:  1    Attempted By:  CRNA    Method of Intubation:  Video laryngoscopy    Blade:  Viveros 3    Laryngeal View Grade: Grade I - full view of cords      Difficult Airway Encountered?: No      Complications:  None    Airway Device:  Oral yen    Airway Device Size:  7.0    Style/Cuff Inflation:  Cuffed (inflated to minimal occlusive pressure)    Secured at:  The lips    Placement Verified By:  Capnometry    Complicating Factors:  None    Findings Post-Intubation:  BS equal bilateral and atraumatic/condition of teeth unchanged

## 2025-06-12 NOTE — H&P
6/12/2025: Presents today for scheduled surgery.    The patient has been examined and the H&P has been reviewed. I concur with the findings as noted and no significant changes have occurred since H&P was written.  Surgery risks, benefits and alternative options discussed and understood by patient/family.    Proceed to OR for surgery TONSILLECTOMY (Bilateral), TONSILLECTOMY AND ADENOIDECTOMY (Bilateral)     Keeley Jaimes MD  Otorhinolaryngology-Head & Neck Surgery    Please page ENT resident on call with any questions or concerns.       Ms. Sosa     Vitals:    06/12/25 0631   BP:    Pulse: 72   Resp: 16   Temp:        Chief Complaint:  No chief complaint on file.       HPI:   is a 24-year-old white female who presents with complaints of recurrent tonsillar infections.  She states that she gets at least 3-4 infections annually requiring antibiotic and steroid therapy.  This has been occurring over the last several years and has become more frequent over the last 6 months.    SNOT22- 10 NOSE- 5    Review of Systems:  Constitutional:   weight loss or weight gain: Negative  Allergy/Immunologic:   Negative  Nasal Congestion/Obstruction:   Negative, positive for postnasal drip  Nosebleeds:   Negative  Sinus infections:   Negative  Headache/Facial Pain:   Negative  Snoring/BERLIN:   Negative  Throat: Infections/Pain:   Positive for recurrent tonsillar infections as above  Hoarseness/Speech Disturbance:   Negative  Trauma Hx:  Negative    Cardiovascular:  M/I Angina: Negative  Hypertension: Negative  Endocrine:    DM/Steroids: Negative  GI:   Dysphagia/Reflux:  Positive for reported history of reflux.  :   GYN Pregnancy: Negative  Renal:   Dialysis: Negative  Lymphatic:   Neck Mass/Lymphadenopathy: Negative  Muscoloskeletal:   Negative  Hematologic:   Bleeding Disorders/Anemia: Negative  Neurologic:    Cranial/Neuralgia: Negative  Pulmonary:   Asthma/SOB/Cough: Negative  Skin Disorders: Negative    Past  Medical/Surgical/Family/Social History:    ENT Surgery: Negative  Occupational Exposure: Negative   Problems: Negative  Cancer: Negative    Past Family History:   Family history of Cancer: Negative    Past Social History:   Tobacco:  Positive for once a week nicotine vaping   Alcohol:  Once weekly Social Drinker      Allergies and medications: Reviewed per med card.    Physical Examination:  Ears:   External auditory canals:  Clear   Hearing: Grossly intact   Tympanic Membranes: Clear  Nose:   External: Normal with good valve support   Intranasal:  Her septum is straight, turbinates 1 to 2+, nasal airway clear.  Mouth:   Intraorally: Lips, teeth, and gums: Normal   Oropharynx: Normal   Mucosa: Normal   Tongue: Normal  Throat:      Palate: Normal palate with elevation, Mallampati 1   Tonsils:  Two to 3+ tonsil on the right and 2+ tonsil on the left   Posterior Pharynx: Normal  Fiberoptic exam: Not performed  Head/Face:     Inspection: Normal and atraumatic   Palpation/Percussion: Non tender   Facial strength: Normal and symmetric   Salivary glands: Normal  Neck: Supple  Thyroid: No masses  Lymphatics: No nodes  Respiratory:   Effort: Normal  Eyes:   Ocular Mobility: Normal   Vision: Grossly intact  Neuro/Psych:   Cranial Nerves: Grossly Intact   Orientation: Normal   Mood/Affect: Normal      Assessment/Plan:  I have discussed my findings with her in detail as well as my recommendations for treatment.  With her history of multiple recurrent tonsillar infections treated with antibiotics and steroids we have discussed tonsillectomy and possible adenoidectomy.  I have discussed the pros and cons risks and benefits as well as technical aspects of this procedure in detail with her.  She understands these and wishes to proceed with scheduling after she checks her work schedule.

## 2025-06-12 NOTE — ANESTHESIA POSTPROCEDURE EVALUATION
Anesthesia Post Evaluation    Patient: Kandis Sosa    Procedure(s) Performed: Procedure(s) (LRB):  TONSILLECTOMY (Bilateral)    Final Anesthesia Type: general      Patient location during evaluation: PACU  Patient participation: Yes- Able to Participate  Level of consciousness: awake and alert  Post-procedure vital signs: reviewed and stable  Pain management: adequate  Airway patency: patent    PONV status at discharge: No PONV  Anesthetic complications: no      Cardiovascular status: blood pressure returned to baseline  Respiratory status: unassisted  Hydration status: euvolemic                Vitals Value Taken Time   /57 06/12/25 09:46   Temp 36.5 °C (97.7 °F) 06/12/25 08:37   Pulse 69 06/12/25 09:57   Resp 23 06/12/25 09:57   SpO2 100 % 06/12/25 09:57   Vitals shown include unfiled device data.      Event Time   Out of Recovery 09:05:46         Pain/Susana Score: Pain Rating Prior to Med Admin: 3 (6/12/2025  9:03 AM)  Susana Score: 10 (6/12/2025  9:34 AM)

## 2025-06-12 NOTE — DISCHARGE INSTRUCTIONS
Postoperative Care  TONSILLECTOMY         DO NOT CALL [facility] ON CALL FOR POST OPERATIVE PROBLEMS. CALL CLINIC -[phone] OR [facility]  -[phone] AND ASK FOR ENT ON CALL.    The tonsils are two pads of tissue that sit at the back of the throat.  The adenoids are formed from the same tissue but sit up behind the nose.  In cases of sleep disordered breathing due to enlargement of these tissues or recurrent infection of these tissues, tonsillectomy with or without adenoidectomy may be indicated.    Surgery:   Removal of the tonsils and adenoids requires general anesthesia.  The procedure typically lasts 30-40 minutes followed by observation in the recovery room until the patient is tolerating liquids. (Typically 1 hour.)  In cases where the patient cannot tolerate liquids or has comorbidities, he/she may be observed overnight.    General Information  You may lack energy for several days, and may also be restless at night. This will improve over 3 to 4 days after an adenoidectomy, and 10 to 14 days after a tonsillectomy. Recovery from an adenoidectomy alone is easier than recovery from a tonsillectomy. It is quite common for you to feel progressively worse during the first 5 to 6 days after surgery. You may also become constipated during this time for three reasons: you will not be eating your regular diet, you will be taking pain medications, and you may be less active.    Postoperative Diet - MOST IMPORTANT POSTOPERATIVE INSTRUCTION IS TO REMAIN HYDRATED  The most important concern after surgery is dehydration.  The patient needs to drink plenty of fluids.  If he/she feels like eating, any food is acceptable.  I recommend trying a very small piece/sip of crunchy, acidic or spicy items before eating/drinking a large amount as they may cause pain.  If the patient is unable to drink an adequate amount of fluids, he/she needs to be seen in the Emergency Department where fluids can be given  intravenously.    It is important for you to drink plenty of fluids for the first 3 days. You should try to drink one drink every hour you are awake. You may not feel like eating after several days. This is alright, as long as you drink lots of fluids. Signs that you need to drink more are when the urine is darker in color (urine should be pale yellow). A high fever that persists may also be a sign that you are not taking in enough fluids. As your appetite improves, solid foods and chewing are strongly encouraged. There are no limits on the sort of foods you can eat. You cannot damage the throat by eating any particular type of food. Favorite liquids can be consumed such as popsicles, slushes, and soft drinks.         Postoperative Pain Control  Patients can have a severe sore throat for approximately 7-10 days after surgery.  This can vary depending on pain tolerance, age, and frequency of infections prior to surgery.  There are typically two times when the pain is most severe: the day following surgery and 5-7 days after surgery when the eschar (scabs) begin to fall off.  It is this second peak that is the most important for controlling pain and encouraging fluids as dehydration at this point may lead to bleeding. Chewing gum may be helpful in lessening muscle spasms and is encouraged.     Narcotic pain medication: Hydrocodone or oxycodone are generally prescribed to adults. This is a narcotic medication to be used every 4-6 hours as needed for severe pain only. We recommend scheduled acetaminophen (tylenol) and Ibuprofen (motrin) every 6 hours for the first 2-5 days.These medications can be alternated so that one or the other can be given every 3 hours. (unless contraindication to use of either medicine). After the first few days, you should attempt to use OTC Tylenol in place of this medication all together. Do not exceed 4g of Tylenol in 24 hours.    If pain cannot be controlled with oral medications the  patient needs to be seen in the Emergency room.    Bleeding  There is a 1-3% risk of bleeding. This can appear as spitting up bright red blood or vomiting old clots.  Please call the clinic or ENT on call and go to your nearest Emergency Room for any bleeding.  Again, adequate hydration can usually prevent bleeding.  Often rehydration with IV fluids will resolve the problem.  Occasionally the patient will need to return to the OR for cautery.    There should be NO bleeding from the nose or mouth. If you have any bleeding at all, sit upright and begin swishing the mouth out with cold ice water. This may help stop the bleeding (rinse and spit over and over). If there is anything more than very minimal bleeding, go to the nearest emergency room. Between 5 and 10 days after surgery, the white or grayish membrane (soft scab) breaks off in the back of the throat. A small amount of bloody mucus may be spit up. If this continues after a few minutes, please call the doctor on call as listed above. If you are unable to reach the doctor quickly, please go to the nearest emergency room/[facility] if you feel safe.    Frequently asked questions:   Postoperative fever is common after surgery.  It can reach as high as 102F.  Use the motrin and tylenol to control this.  If there is a fever as well as a new symptom such as cough, call the clinic.  - It is normal for you to have a slight fever (99.0° to 101.0°) for the first few days following surgery. Good fluid intake and Tylenol will help keep the fever down. If the fever is over 101 degrees even after tylenol or for >2 days, contact your doctor.    2. Following tonsillectomy there will be two large white patches on the back of the throat. These are essentially wet scabs from the surgery. It is not thrush or infection.  Over the next week, these scabs will resolve.    Frequently, patients will complain of ear pain.  This is referred pain from the throat.  Treat it as throat  pain with pain medication.    3. Nausea/Vomiting  It is not unusual for you to feel sick following a tonsillectomy. You may take the medication prescribed for nausea at this time. If vomiting persists into late evening, you may want to contact your doctor for another medication to help you feel better. If you are still vomiting the day after surgery, you need to notify the doctor or go to the emergency room.    4. Bad breath/Snoring    - Frequently patients will have halitosis after surgery.  Avoid mouth washes as they contain alcohol and may sting.  Brushing the teeth is okay.  - You may gargle with a mild salt water solution to improve the bad breath (1/2 teaspoon table salt to 8 oz. of warm tap water). You may also chew gum. Most patients breathe through the mouth and snore during the recovery period due to swelling. This may last 2-3 weeks. It may be helped by propping up with pillows and using an ice collar. Turning on a humidifier at bedtime may lessen throat dryness caused by mouth breathing. Avoid over-the-counter mouthwashes (Cepacol, Scope, Listerine, etc.), as they tend to dry the throat and cause discomfort.    5. Activity   - You should rest at home for the first 48 hours with light activity as tolerated. Activity may increase as strength returns. Generally, you may return to work approximately 10 days following a tonsillectomy, and aAs long as the patient is under observation, you do not need to limit activity.  In fact, patients that feel like doing light activity are usually those with good pain control and hydration.    The guidelines show that antibiotics are not recommended after surgery as they do not help with pain or fever.  For this reason, you will not have any antibiotics after surgery.    If you are currently on Flonase or other nasal steroid spray, please hold for 2 weeks after surgery.

## 2025-06-12 NOTE — OP NOTE
Operative Note  [facility] ([facility])    DATE OF PROCEDURE: 06/12/2025    PREOPERATIVE DIAGNOSIS: Tonsillar hypertrophy.    POSTOPERATIVE DIAGNOSIS: Tonsillar hypertrophy.    PROCEDURE PERFORMED:   Tonsillectomy (CPT 59886)    SURGEON: DANO Zafar III, M.D.    ASSISTANT SURGEON: Keeley Jaimes MD, MPH    ANESTHESIA: General anesthesia with endotracheal intubation.    ESTIMATED BLOOD LOSS: 15 mL.    COMPLICATIONS: None apparent    INDICATIONS:Kandis Sosa is a 24 y.o. female who relates a history of recurrent tonsillitis. They were found to have tonsillar hypertrophy so a tonsillectomy was recommended. The risks and benefits were explained and the patient elected to proceed to surgery.    PROCEDURE IN DETAIL: After the appropriate consents were obtained, the patient was brought to the Operating Room.  After successful endotracheal intubation, general anesthesia was initiated, a complete timeout was then held with the surgical, Anesthesia and nursing teams. The patient was then rotated 90 degrees away from anesthesia. After the patient was prepped and draped in the usual fashion, a #4 Stephanie-Daniel mouth gag retractor was advanced into the oral cavity. The adenoid pad was then examined with a mirror. There was no adenoid hypertrophy.     The right tonsil was grasped with a straight Allis clamp and the tonsil was then excised with Bovie electrocautery. The left tonsil was then grasped with the curved Allis clamp. This was similarly excised using a Bovie electrocautery. Bilateral tonsillar beds/pillars were superficially injected with marcaine local anesthetic to assist with postoperative pain control. Hemostasis was then achieved in bilateral tonsillar fossa using suction electrocautery.     The oral cavity was then copiously irrigated. The stomach was then suctioned clean with an orogastric tube. The Stephanie-Daniel mouth gag retractor was then removed from the oral cavity, patient  was inspected and noted to be free of injury from instrumentation. Gastric contents were suctioned with an OG tube. At this point, the patient was turned back towards anesthesia for emergence and extubation. The patient was then transferred to Post Anesthesia Care Unit in stable condition.    Dr. Zafar was present and participated in the entirety of the case.

## 2025-06-12 NOTE — TRANSFER OF CARE
Anesthesia Transfer of Care Note    Patient: Kandis Sosa    Procedure(s) Performed: Procedure(s) (LRB):  TONSILLECTOMY (Bilateral)    Patient location: PACU    Anesthesia Type: general    Transport from OR: Transported from OR on 6-10 L/min O2 by face mask with adequate spontaneous ventilation    Post pain: adequate analgesia    Post assessment: no apparent anesthetic complications and tolerated procedure well    Post vital signs: stable    Level of consciousness: sedated and responds to stimulation    Nausea/Vomiting: no nausea/vomiting    Complications: none    Transfer of care protocol was followed      Last vitals: Visit Vitals  /68 (BP Location: Right arm, Patient Position: Lying)   Pulse 72   Temp 36.5 °C (97.7 °F) (Temporal)   Resp 16   Wt 54.4 kg (120 lb)   LMP 05/15/2025 (Approximate)   SpO2 100%   Breastfeeding No   BMI 22.67 kg/m²

## 2025-06-16 LAB
ESTROGEN SERPL-MCNC: NORMAL PG/ML
INSULIN SERPL-ACNC: NORMAL U[IU]/ML
LAB AP CLINICAL INFORMATION: NORMAL
LAB AP GROSS DESCRIPTION: NORMAL
LAB AP PERFORMING LOCATION(S): NORMAL
LAB AP REPORT FOOTNOTES: NORMAL

## 2025-06-18 ENCOUNTER — PATIENT MESSAGE (OUTPATIENT)
Dept: OTOLARYNGOLOGY | Facility: CLINIC | Age: 25
End: 2025-06-18
Payer: COMMERCIAL

## 2025-06-19 ENCOUNTER — PATIENT MESSAGE (OUTPATIENT)
Dept: OTOLARYNGOLOGY | Facility: CLINIC | Age: 25
End: 2025-06-19
Payer: COMMERCIAL

## 2025-06-19 DIAGNOSIS — Z98.890 POST-OPERATIVE STATE: Primary | ICD-10-CM

## 2025-06-19 DIAGNOSIS — R11.0 NAUSEA: ICD-10-CM

## 2025-06-19 RX ORDER — ONDANSETRON 4 MG/1
8 TABLET, ORALLY DISINTEGRATING ORAL EVERY 6 HOURS PRN
Qty: 12 TABLET | Refills: 0 | Status: SHIPPED | OUTPATIENT
Start: 2025-06-19

## 2025-06-25 ENCOUNTER — OFFICE VISIT (OUTPATIENT)
Dept: OTOLARYNGOLOGY | Facility: CLINIC | Age: 25
End: 2025-06-25
Payer: COMMERCIAL

## 2025-06-25 VITALS
DIASTOLIC BLOOD PRESSURE: 69 MMHG | WEIGHT: 122.13 LBS | HEART RATE: 73 BPM | BODY MASS INDEX: 23.08 KG/M2 | SYSTOLIC BLOOD PRESSURE: 104 MMHG

## 2025-06-25 DIAGNOSIS — J03.91 RECURRENT TONSILLITIS: Primary | ICD-10-CM

## 2025-06-25 DIAGNOSIS — Z98.890 POST-OPERATIVE STATE: ICD-10-CM

## 2025-06-25 PROCEDURE — 1159F MED LIST DOCD IN RCRD: CPT | Mod: CPTII,S$GLB,, | Performed by: OTOLARYNGOLOGY

## 2025-06-25 PROCEDURE — 3074F SYST BP LT 130 MM HG: CPT | Mod: CPTII,S$GLB,, | Performed by: OTOLARYNGOLOGY

## 2025-06-25 PROCEDURE — 99999 PR PBB SHADOW E&M-EST. PATIENT-LVL III: CPT | Mod: PBBFAC,,, | Performed by: OTOLARYNGOLOGY

## 2025-06-25 PROCEDURE — 1160F RVW MEDS BY RX/DR IN RCRD: CPT | Mod: CPTII,S$GLB,, | Performed by: OTOLARYNGOLOGY

## 2025-06-25 PROCEDURE — 3078F DIAST BP <80 MM HG: CPT | Mod: CPTII,S$GLB,, | Performed by: OTOLARYNGOLOGY

## 2025-06-25 PROCEDURE — 99024 POSTOP FOLLOW-UP VISIT: CPT | Mod: S$GLB,,, | Performed by: OTOLARYNGOLOGY

## 2025-06-25 NOTE — PROGRESS NOTES
Two weeks status post tonsillectomy.  She is advancing her diet slowly and is on semi solid foods at this time.  She is maintaining her hydration.  On examination her tonsillar fossa are healing well with minimal light gray eschar remaining.  Path:  Bilateral tonsils acutely inflamed, with lymphoid hyperplasia.   Plan:  Continue to advance her diet as tolerated and maintain hydration.  Return to clinic 2 months for follow-up or p.r.n. sooner.

## 2025-08-19 ENCOUNTER — OFFICE VISIT (OUTPATIENT)
Dept: OTOLARYNGOLOGY | Facility: CLINIC | Age: 25
End: 2025-08-19
Payer: COMMERCIAL

## 2025-08-19 VITALS
WEIGHT: 128.31 LBS | HEART RATE: 163 BPM | SYSTOLIC BLOOD PRESSURE: 103 MMHG | BODY MASS INDEX: 24.24 KG/M2 | DIASTOLIC BLOOD PRESSURE: 66 MMHG

## 2025-08-19 DIAGNOSIS — Z90.89 S/P TONSILLECTOMY: Primary | ICD-10-CM

## 2025-08-19 PROCEDURE — 3078F DIAST BP <80 MM HG: CPT | Mod: CPTII,S$GLB,, | Performed by: OTOLARYNGOLOGY

## 2025-08-19 PROCEDURE — 3074F SYST BP LT 130 MM HG: CPT | Mod: CPTII,S$GLB,, | Performed by: OTOLARYNGOLOGY

## 2025-08-19 PROCEDURE — 99024 POSTOP FOLLOW-UP VISIT: CPT | Mod: S$GLB,,, | Performed by: OTOLARYNGOLOGY

## 2025-08-19 PROCEDURE — 1160F RVW MEDS BY RX/DR IN RCRD: CPT | Mod: CPTII,S$GLB,, | Performed by: OTOLARYNGOLOGY

## 2025-08-19 PROCEDURE — 1159F MED LIST DOCD IN RCRD: CPT | Mod: CPTII,S$GLB,, | Performed by: OTOLARYNGOLOGY

## 2025-08-19 PROCEDURE — 99999 PR PBB SHADOW E&M-EST. PATIENT-LVL III: CPT | Mod: PBBFAC,,, | Performed by: OTOLARYNGOLOGY

## (undated) DEVICE — Device

## (undated) DEVICE — UNDERGLOVE BIOGEL PI MICRO BLUE SZ 8

## (undated) DEVICE — SUCTION/IRRIGATOR W/TIP

## (undated) DEVICE — PAD BOVIE ADULT

## (undated) DEVICE — CABLE MONOPOLAR 10FT DISPOSABLE

## (undated) DEVICE — SOLUTION IRRI H2O BOTTLE 1000ML

## (undated) DEVICE — SCISSORS 5MM APPLIED MEDICAL   CB030

## (undated) DEVICE — PENCIL ELECTROSURG HOLST W/BLD

## (undated) DEVICE — DRAPE STERI-DRAPE 1000 17X11IN

## (undated) DEVICE — SOLUTION IRRI NS BOTTLE 1000ML R5200-01

## (undated) DEVICE — TUBING SUC UNIV W/CONN 12FT

## (undated) DEVICE — TROCAR OPTICAL ZTHREAD 5MMX100MM CTF03

## (undated) DEVICE — APPLIER CLIP  5MM

## (undated) DEVICE — SOLUTION NACL 0.9% 3000ML

## (undated) DEVICE — SUTURE MONOCRYL 4-0 PS-2 27 MCP426H

## (undated) DEVICE — ELECTRODE REM PLYHSV RETURN 9

## (undated) DEVICE — CATH ALL PUR URTHL RR 10FR

## (undated) DEVICE — GLOVE BIOGEL ECLIPSE SZ 7.5

## (undated) DEVICE — BLADE 4IN EDGE INSULATED

## (undated) DEVICE — GLOVE BIOGEL PI ULTRA TOUCH GRAY SZ7.5

## (undated) DEVICE — TROCAR BALL. BLNT HASSON C0R47

## (undated) DEVICE — STERISTRIP 1/2 R1547

## (undated) DEVICE — SWABSTICK BENZOIN S42450

## (undated) DEVICE — SLEEVE TROCAR 5MMX100MM  CTS02

## (undated) DEVICE — DRESSING MEPORE 2.5 X 3   670800

## (undated) DEVICE — TRAY GENERAL LAPAROSCOPY

## (undated) DEVICE — GOWN ECLIPSE REINF LVL4 TWL LG

## (undated) DEVICE — TIP YANKAUERS BULB NO VENT

## (undated) DEVICE — SUTURE ETHIBOND 0 MO-6 18 CX45D